# Patient Record
Sex: MALE | Race: WHITE | NOT HISPANIC OR LATINO | Employment: OTHER | URBAN - METROPOLITAN AREA
[De-identification: names, ages, dates, MRNs, and addresses within clinical notes are randomized per-mention and may not be internally consistent; named-entity substitution may affect disease eponyms.]

---

## 2017-07-31 ENCOUNTER — HOSPITAL ENCOUNTER (OUTPATIENT)
Dept: GENERAL RADIOLOGY | Facility: HOSPITAL | Age: 72
Discharge: HOME | End: 2017-07-31
Admitting: ORTHOPAEDIC SURGERY

## 2017-07-31 ENCOUNTER — HOSPITAL ENCOUNTER (OUTPATIENT)
Dept: GENERAL RADIOLOGY | Facility: HOSPITAL | Age: 72
Discharge: HOME | End: 2017-07-31

## 2017-07-31 ENCOUNTER — APPOINTMENT (OUTPATIENT)
Dept: PREADMISSION TESTING | Facility: HOSPITAL | Age: 72
End: 2017-07-31

## 2017-07-31 VITALS
HEIGHT: 73 IN | BODY MASS INDEX: 35.12 KG/M2 | TEMPERATURE: 98.7 F | WEIGHT: 265 LBS | HEART RATE: 66 BPM | RESPIRATION RATE: 20 BRPM | DIASTOLIC BLOOD PRESSURE: 74 MMHG | SYSTOLIC BLOOD PRESSURE: 158 MMHG | OXYGEN SATURATION: 95 %

## 2017-07-31 LAB
ALBUMIN SERPL-MCNC: 4.5 G/DL (ref 3.5–5.2)
ALBUMIN/GLOB SERPL: 1.5 G/DL
ALP SERPL-CCNC: 49 U/L (ref 39–117)
ALT SERPL W P-5'-P-CCNC: 22 U/L (ref 1–41)
ANION GAP SERPL CALCULATED.3IONS-SCNC: 14.5 MMOL/L
APTT PPP: 28.5 SECONDS (ref 22.7–35.4)
AST SERPL-CCNC: 21 U/L (ref 1–40)
BASOPHILS # BLD AUTO: 0.03 10*3/MM3 (ref 0–0.2)
BASOPHILS NFR BLD AUTO: 0.4 % (ref 0–1.5)
BILIRUB SERPL-MCNC: 0.5 MG/DL (ref 0.1–1.2)
BILIRUB UR QL STRIP: NEGATIVE
BUN BLD-MCNC: 19 MG/DL (ref 8–23)
BUN/CREAT SERPL: 20 (ref 7–25)
CALCIUM SPEC-SCNC: 9.7 MG/DL (ref 8.6–10.5)
CHLORIDE SERPL-SCNC: 98 MMOL/L (ref 98–107)
CLARITY UR: CLEAR
CO2 SERPL-SCNC: 28.5 MMOL/L (ref 22–29)
COLOR UR: YELLOW
CREAT BLD-MCNC: 0.95 MG/DL (ref 0.76–1.27)
DEPRECATED RDW RBC AUTO: 49.6 FL (ref 37–54)
EOSINOPHIL # BLD AUTO: 0.28 10*3/MM3 (ref 0–0.7)
EOSINOPHIL NFR BLD AUTO: 3.7 % (ref 0.3–6.2)
ERYTHROCYTE [DISTWIDTH] IN BLOOD BY AUTOMATED COUNT: 14.7 % (ref 11.5–14.5)
GFR SERPL CREATININE-BSD FRML MDRD: 78 ML/MIN/1.73
GLOBULIN UR ELPH-MCNC: 3 GM/DL
GLUCOSE BLD-MCNC: 102 MG/DL (ref 65–99)
GLUCOSE UR STRIP-MCNC: NEGATIVE MG/DL
HCT VFR BLD AUTO: 40.1 % (ref 40.4–52.2)
HGB BLD-MCNC: 12.9 G/DL (ref 13.7–17.6)
HGB UR QL STRIP.AUTO: NEGATIVE
IMM GRANULOCYTES # BLD: 0 10*3/MM3 (ref 0–0.03)
IMM GRANULOCYTES NFR BLD: 0 % (ref 0–0.5)
INR PPP: 1.05 (ref 0.9–1.1)
KETONES UR QL STRIP: NEGATIVE
LEUKOCYTE ESTERASE UR QL STRIP.AUTO: NEGATIVE
LYMPHOCYTES # BLD AUTO: 2.02 10*3/MM3 (ref 0.9–4.8)
LYMPHOCYTES NFR BLD AUTO: 26.4 % (ref 19.6–45.3)
MCH RBC QN AUTO: 30 PG (ref 27–32.7)
MCHC RBC AUTO-ENTMCNC: 32.2 G/DL (ref 32.6–36.4)
MCV RBC AUTO: 93.3 FL (ref 79.8–96.2)
MONOCYTES # BLD AUTO: 0.67 10*3/MM3 (ref 0.2–1.2)
MONOCYTES NFR BLD AUTO: 8.8 % (ref 5–12)
NEUTROPHILS # BLD AUTO: 4.64 10*3/MM3 (ref 1.9–8.1)
NEUTROPHILS NFR BLD AUTO: 60.7 % (ref 42.7–76)
NITRITE UR QL STRIP: NEGATIVE
PH UR STRIP.AUTO: 5.5 [PH] (ref 5–8)
PLATELET # BLD AUTO: 190 10*3/MM3 (ref 140–500)
PMV BLD AUTO: 11 FL (ref 6–12)
POTASSIUM BLD-SCNC: 4.6 MMOL/L (ref 3.5–5.2)
PROT SERPL-MCNC: 7.5 G/DL (ref 6–8.5)
PROT UR QL STRIP: NEGATIVE
PROTHROMBIN TIME: 13.3 SECONDS (ref 11.7–14.2)
RBC # BLD AUTO: 4.3 10*6/MM3 (ref 4.6–6)
SODIUM BLD-SCNC: 141 MMOL/L (ref 136–145)
SP GR UR STRIP: 1.01 (ref 1–1.03)
UROBILINOGEN UR QL STRIP: NORMAL
WBC NRBC COR # BLD: 7.64 10*3/MM3 (ref 4.5–10.7)

## 2017-07-31 PROCEDURE — 85610 PROTHROMBIN TIME: CPT | Performed by: ORTHOPAEDIC SURGERY

## 2017-07-31 PROCEDURE — 36415 COLL VENOUS BLD VENIPUNCTURE: CPT

## 2017-07-31 PROCEDURE — 85730 THROMBOPLASTIN TIME PARTIAL: CPT | Performed by: ORTHOPAEDIC SURGERY

## 2017-07-31 PROCEDURE — 81003 URINALYSIS AUTO W/O SCOPE: CPT | Performed by: ORTHOPAEDIC SURGERY

## 2017-07-31 PROCEDURE — 71020 HC CHEST PA AND LATERAL: CPT

## 2017-07-31 PROCEDURE — 85025 COMPLETE CBC W/AUTO DIFF WBC: CPT | Performed by: ORTHOPAEDIC SURGERY

## 2017-07-31 PROCEDURE — 73560 X-RAY EXAM OF KNEE 1 OR 2: CPT

## 2017-07-31 PROCEDURE — 80053 COMPREHEN METABOLIC PANEL: CPT | Performed by: ORTHOPAEDIC SURGERY

## 2017-07-31 RX ORDER — CHOLECALCIFEROL (VITAMIN D3) 125 MCG
500 CAPSULE ORAL EVERY MORNING
COMMUNITY

## 2017-07-31 RX ORDER — ATORVASTATIN CALCIUM 80 MG/1
80 TABLET, FILM COATED ORAL EVERY MORNING
COMMUNITY

## 2017-07-31 RX ORDER — METOPROLOL TARTRATE 50 MG/1
50 TABLET, FILM COATED ORAL 2 TIMES DAILY
COMMUNITY

## 2017-07-31 RX ORDER — AMITRIPTYLINE HYDROCHLORIDE 25 MG/1
25 TABLET, FILM COATED ORAL NIGHTLY
Status: ON HOLD | COMMUNITY
End: 2020-05-29

## 2017-07-31 RX ORDER — HYDROCHLOROTHIAZIDE 25 MG/1
25 TABLET ORAL EVERY MORNING
COMMUNITY

## 2017-07-31 RX ORDER — TERAZOSIN 5 MG/1
5 CAPSULE ORAL NIGHTLY
COMMUNITY

## 2017-07-31 RX ORDER — ASPIRIN 81 MG/1
81 TABLET ORAL EVERY MORNING
COMMUNITY
End: 2017-08-17 | Stop reason: HOSPADM

## 2017-07-31 RX ORDER — TRAMADOL HYDROCHLORIDE 50 MG/1
50 TABLET ORAL EVERY 6 HOURS PRN
COMMUNITY

## 2017-07-31 RX ORDER — LISINOPRIL 40 MG/1
40 TABLET ORAL EVERY MORNING
COMMUNITY

## 2017-07-31 RX ORDER — GLIPIZIDE 5 MG/1
10 TABLET ORAL
COMMUNITY

## 2017-08-14 ENCOUNTER — ANESTHESIA (OUTPATIENT)
Dept: PERIOP | Facility: HOSPITAL | Age: 72
End: 2017-08-14

## 2017-08-14 ENCOUNTER — APPOINTMENT (OUTPATIENT)
Dept: GENERAL RADIOLOGY | Facility: HOSPITAL | Age: 72
End: 2017-08-14

## 2017-08-14 ENCOUNTER — HOSPITAL ENCOUNTER (INPATIENT)
Facility: HOSPITAL | Age: 72
LOS: 3 days | Discharge: HOME HEALTH | End: 2017-08-17
Attending: ORTHOPAEDIC SURGERY | Admitting: ORTHOPAEDIC SURGERY

## 2017-08-14 ENCOUNTER — ANESTHESIA EVENT (OUTPATIENT)
Dept: PERIOP | Facility: HOSPITAL | Age: 72
End: 2017-08-14

## 2017-08-14 DIAGNOSIS — T84.093A FAILED TOTAL KNEE, LEFT (HCC): ICD-10-CM

## 2017-08-14 PROBLEM — M17.9 OSTEOARTHRITIS, KNEE: Status: ACTIVE | Noted: 2017-08-14

## 2017-08-14 LAB
GLUCOSE BLDC GLUCOMTR-MCNC: 106 MG/DL (ref 70–130)
GLUCOSE BLDC GLUCOMTR-MCNC: 193 MG/DL (ref 70–130)

## 2017-08-14 PROCEDURE — C1776 JOINT DEVICE (IMPLANTABLE): HCPCS | Performed by: ORTHOPAEDIC SURGERY

## 2017-08-14 PROCEDURE — 25010000002 FENTANYL CITRATE (PF) 100 MCG/2ML SOLUTION: Performed by: ANESTHESIOLOGY

## 2017-08-14 PROCEDURE — 25010000002 ONDANSETRON PER 1 MG: Performed by: ANESTHESIOLOGY

## 2017-08-14 PROCEDURE — 25010000002 SUCCINYLCHOLINE PER 20 MG: Performed by: NURSE ANESTHETIST, CERTIFIED REGISTERED

## 2017-08-14 PROCEDURE — 0SPD0JZ REMOVAL OF SYNTHETIC SUBSTITUTE FROM LEFT KNEE JOINT, OPEN APPROACH: ICD-10-PCS | Performed by: ORTHOPAEDIC SURGERY

## 2017-08-14 PROCEDURE — 25010000002 KETOROLAC TROMETHAMINE PER 15 MG

## 2017-08-14 PROCEDURE — 25010000003 CEFAZOLIN IN DEXTROSE 2-4 GM/100ML-% SOLUTION: Performed by: ANESTHESIOLOGY

## 2017-08-14 PROCEDURE — 25010000002 HYDROMORPHONE PER 4 MG: Performed by: ANESTHESIOLOGY

## 2017-08-14 PROCEDURE — 25010000002 FENTANYL CITRATE (PF) 100 MCG/2ML SOLUTION: Performed by: NURSE ANESTHETIST, CERTIFIED REGISTERED

## 2017-08-14 PROCEDURE — 73560 X-RAY EXAM OF KNEE 1 OR 2: CPT

## 2017-08-14 PROCEDURE — 25010000002 NEOSTIGMINE PER 0.5 MG: Performed by: ANESTHESIOLOGY

## 2017-08-14 PROCEDURE — 25010000002 MIDAZOLAM PER 1 MG: Performed by: ANESTHESIOLOGY

## 2017-08-14 PROCEDURE — 0SRD0J9 REPLACEMENT OF LEFT KNEE JOINT WITH SYNTHETIC SUBSTITUTE, CEMENTED, OPEN APPROACH: ICD-10-PCS | Performed by: ORTHOPAEDIC SURGERY

## 2017-08-14 PROCEDURE — 87070 CULTURE OTHR SPECIMN AEROBIC: CPT | Performed by: ORTHOPAEDIC SURGERY

## 2017-08-14 PROCEDURE — 25010000003 CEFAZOLIN IN DEXTROSE 2-4 GM/100ML-% SOLUTION: Performed by: ORTHOPAEDIC SURGERY

## 2017-08-14 PROCEDURE — 87205 SMEAR GRAM STAIN: CPT | Performed by: ORTHOPAEDIC SURGERY

## 2017-08-14 PROCEDURE — C1713 ANCHOR/SCREW BN/BN,TIS/BN: HCPCS | Performed by: ORTHOPAEDIC SURGERY

## 2017-08-14 PROCEDURE — 25010000002 VANCOMYCIN: Performed by: ORTHOPAEDIC SURGERY

## 2017-08-14 PROCEDURE — 82962 GLUCOSE BLOOD TEST: CPT

## 2017-08-14 PROCEDURE — 25010000002 PROPOFOL 10 MG/ML EMULSION: Performed by: NURSE ANESTHETIST, CERTIFIED REGISTERED

## 2017-08-14 DEVICE — IMPLANTABLE DEVICE: Type: IMPLANTABLE DEVICE | Site: KNEE | Status: FUNCTIONAL

## 2017-08-14 DEVICE — CMT BONE PALACOS 120001: Type: IMPLANTABLE DEVICE | Site: KNEE | Status: FUNCTIONAL

## 2017-08-14 RX ORDER — ACETAMINOPHEN 325 MG/1
325 TABLET ORAL EVERY 4 HOURS PRN
Status: DISCONTINUED | OUTPATIENT
Start: 2017-08-14 | End: 2017-08-17 | Stop reason: HOSPADM

## 2017-08-14 RX ORDER — KETOROLAC TROMETHAMINE 30 MG/ML
INJECTION, SOLUTION INTRAMUSCULAR; INTRAVENOUS
Status: COMPLETED
Start: 2017-08-14 | End: 2017-08-14

## 2017-08-14 RX ORDER — PROMETHAZINE HYDROCHLORIDE 25 MG/1
12.5 TABLET ORAL ONCE AS NEEDED
Status: DISCONTINUED | OUTPATIENT
Start: 2017-08-14 | End: 2017-08-14 | Stop reason: HOSPADM

## 2017-08-14 RX ORDER — CEFAZOLIN SODIUM 2 G/100ML
2 INJECTION, SOLUTION INTRAVENOUS EVERY 8 HOURS
Status: COMPLETED | OUTPATIENT
Start: 2017-08-14 | End: 2017-08-15

## 2017-08-14 RX ORDER — ONDANSETRON 4 MG/1
4 TABLET, ORALLY DISINTEGRATING ORAL EVERY 6 HOURS PRN
Status: DISCONTINUED | OUTPATIENT
Start: 2017-08-14 | End: 2017-08-17 | Stop reason: HOSPADM

## 2017-08-14 RX ORDER — GLYCOPYRROLATE 0.2 MG/ML
INJECTION INTRAMUSCULAR; INTRAVENOUS AS NEEDED
Status: DISCONTINUED | OUTPATIENT
Start: 2017-08-14 | End: 2017-08-14 | Stop reason: SURG

## 2017-08-14 RX ORDER — PROMETHAZINE HYDROCHLORIDE 25 MG/1
25 TABLET ORAL ONCE AS NEEDED
Status: DISCONTINUED | OUTPATIENT
Start: 2017-08-14 | End: 2017-08-14 | Stop reason: HOSPADM

## 2017-08-14 RX ORDER — LIDOCAINE HYDROCHLORIDE 10 MG/ML
0.5 INJECTION, SOLUTION EPIDURAL; INFILTRATION; INTRACAUDAL; PERINEURAL ONCE AS NEEDED
Status: DISCONTINUED | OUTPATIENT
Start: 2017-08-14 | End: 2017-08-14 | Stop reason: HOSPADM

## 2017-08-14 RX ORDER — BISACODYL 5 MG/1
10 TABLET, DELAYED RELEASE ORAL DAILY PRN
Status: DISCONTINUED | OUTPATIENT
Start: 2017-08-14 | End: 2017-08-17 | Stop reason: HOSPADM

## 2017-08-14 RX ORDER — MIDAZOLAM HYDROCHLORIDE 1 MG/ML
1 INJECTION INTRAMUSCULAR; INTRAVENOUS
Status: DISCONTINUED | OUTPATIENT
Start: 2017-08-14 | End: 2017-08-14 | Stop reason: HOSPADM

## 2017-08-14 RX ORDER — AMITRIPTYLINE HYDROCHLORIDE 25 MG/1
25 TABLET, FILM COATED ORAL NIGHTLY
Status: DISCONTINUED | OUTPATIENT
Start: 2017-08-14 | End: 2017-08-17 | Stop reason: HOSPADM

## 2017-08-14 RX ORDER — BISACODYL 10 MG
10 SUPPOSITORY, RECTAL RECTAL DAILY PRN
Status: DISCONTINUED | OUTPATIENT
Start: 2017-08-14 | End: 2017-08-17 | Stop reason: HOSPADM

## 2017-08-14 RX ORDER — CEFAZOLIN SODIUM 2 G/100ML
INJECTION, SOLUTION INTRAVENOUS AS NEEDED
Status: DISCONTINUED | OUTPATIENT
Start: 2017-08-14 | End: 2017-08-14 | Stop reason: SURG

## 2017-08-14 RX ORDER — GLIPIZIDE 5 MG/1
5 TABLET ORAL
Status: DISCONTINUED | OUTPATIENT
Start: 2017-08-15 | End: 2017-08-17 | Stop reason: HOSPADM

## 2017-08-14 RX ORDER — LISINOPRIL 40 MG/1
40 TABLET ORAL EVERY MORNING
Status: DISCONTINUED | OUTPATIENT
Start: 2017-08-15 | End: 2017-08-17 | Stop reason: HOSPADM

## 2017-08-14 RX ORDER — LABETALOL HYDROCHLORIDE 5 MG/ML
5 INJECTION, SOLUTION INTRAVENOUS
Status: DISCONTINUED | OUTPATIENT
Start: 2017-08-14 | End: 2017-08-14 | Stop reason: HOSPADM

## 2017-08-14 RX ORDER — ONDANSETRON 4 MG/1
4 TABLET, FILM COATED ORAL EVERY 6 HOURS PRN
Status: DISCONTINUED | OUTPATIENT
Start: 2017-08-14 | End: 2017-08-17 | Stop reason: HOSPADM

## 2017-08-14 RX ORDER — KETOROLAC TROMETHAMINE 30 MG/ML
30 INJECTION, SOLUTION INTRAMUSCULAR; INTRAVENOUS EVERY 6 HOURS
Status: COMPLETED | OUTPATIENT
Start: 2017-08-14 | End: 2017-08-15

## 2017-08-14 RX ORDER — ONDANSETRON 2 MG/ML
4 INJECTION INTRAMUSCULAR; INTRAVENOUS EVERY 6 HOURS PRN
Status: DISCONTINUED | OUTPATIENT
Start: 2017-08-14 | End: 2017-08-17 | Stop reason: HOSPADM

## 2017-08-14 RX ORDER — EPHEDRINE SULFATE 50 MG/ML
5 INJECTION, SOLUTION INTRAVENOUS ONCE AS NEEDED
Status: DISCONTINUED | OUTPATIENT
Start: 2017-08-14 | End: 2017-08-14 | Stop reason: HOSPADM

## 2017-08-14 RX ORDER — HYDROCHLOROTHIAZIDE 25 MG/1
25 TABLET ORAL EVERY MORNING
Status: DISCONTINUED | OUTPATIENT
Start: 2017-08-15 | End: 2017-08-17 | Stop reason: HOSPADM

## 2017-08-14 RX ORDER — ONDANSETRON 2 MG/ML
4 INJECTION INTRAMUSCULAR; INTRAVENOUS ONCE AS NEEDED
Status: DISCONTINUED | OUTPATIENT
Start: 2017-08-14 | End: 2017-08-14 | Stop reason: HOSPADM

## 2017-08-14 RX ORDER — ROCURONIUM BROMIDE 10 MG/ML
INJECTION, SOLUTION INTRAVENOUS AS NEEDED
Status: DISCONTINUED | OUTPATIENT
Start: 2017-08-14 | End: 2017-08-14 | Stop reason: SURG

## 2017-08-14 RX ORDER — SENNA AND DOCUSATE SODIUM 50; 8.6 MG/1; MG/1
2 TABLET, FILM COATED ORAL 2 TIMES DAILY
Status: DISCONTINUED | OUTPATIENT
Start: 2017-08-14 | End: 2017-08-17 | Stop reason: HOSPADM

## 2017-08-14 RX ORDER — PROPOFOL 10 MG/ML
VIAL (ML) INTRAVENOUS AS NEEDED
Status: DISCONTINUED | OUTPATIENT
Start: 2017-08-14 | End: 2017-08-14 | Stop reason: SURG

## 2017-08-14 RX ORDER — NALOXONE HCL 0.4 MG/ML
0.2 VIAL (ML) INJECTION AS NEEDED
Status: DISCONTINUED | OUTPATIENT
Start: 2017-08-14 | End: 2017-08-14 | Stop reason: HOSPADM

## 2017-08-14 RX ORDER — OXYCODONE AND ACETAMINOPHEN 7.5; 325 MG/1; MG/1
1 TABLET ORAL ONCE AS NEEDED
Status: DISCONTINUED | OUTPATIENT
Start: 2017-08-14 | End: 2017-08-14 | Stop reason: HOSPADM

## 2017-08-14 RX ORDER — FENTANYL CITRATE 50 UG/ML
100 INJECTION, SOLUTION INTRAMUSCULAR; INTRAVENOUS
Status: DISCONTINUED | OUTPATIENT
Start: 2017-08-14 | End: 2017-08-14 | Stop reason: HOSPADM

## 2017-08-14 RX ORDER — PROMETHAZINE HYDROCHLORIDE 25 MG/ML
12.5 INJECTION, SOLUTION INTRAMUSCULAR; INTRAVENOUS ONCE AS NEEDED
Status: DISCONTINUED | OUTPATIENT
Start: 2017-08-14 | End: 2017-08-14 | Stop reason: HOSPADM

## 2017-08-14 RX ORDER — SODIUM CHLORIDE, SODIUM LACTATE, POTASSIUM CHLORIDE, CALCIUM CHLORIDE 600; 310; 30; 20 MG/100ML; MG/100ML; MG/100ML; MG/100ML
9 INJECTION, SOLUTION INTRAVENOUS CONTINUOUS
Status: DISCONTINUED | OUTPATIENT
Start: 2017-08-14 | End: 2017-08-17 | Stop reason: HOSPADM

## 2017-08-14 RX ORDER — FENTANYL CITRATE 50 UG/ML
INJECTION, SOLUTION INTRAMUSCULAR; INTRAVENOUS AS NEEDED
Status: DISCONTINUED | OUTPATIENT
Start: 2017-08-14 | End: 2017-08-14 | Stop reason: SURG

## 2017-08-14 RX ORDER — HYDROMORPHONE HYDROCHLORIDE 1 MG/ML
0.5 INJECTION, SOLUTION INTRAMUSCULAR; INTRAVENOUS; SUBCUTANEOUS
Status: DISCONTINUED | OUTPATIENT
Start: 2017-08-14 | End: 2017-08-14 | Stop reason: HOSPADM

## 2017-08-14 RX ORDER — MIDAZOLAM HYDROCHLORIDE 1 MG/ML
2 INJECTION INTRAMUSCULAR; INTRAVENOUS
Status: DISCONTINUED | OUTPATIENT
Start: 2017-08-14 | End: 2017-08-14 | Stop reason: HOSPADM

## 2017-08-14 RX ORDER — OXYCODONE HYDROCHLORIDE AND ACETAMINOPHEN 5; 325 MG/1; MG/1
2 TABLET ORAL EVERY 4 HOURS PRN
Status: DISCONTINUED | OUTPATIENT
Start: 2017-08-14 | End: 2017-08-17 | Stop reason: HOSPADM

## 2017-08-14 RX ORDER — FAMOTIDINE 10 MG/ML
20 INJECTION, SOLUTION INTRAVENOUS ONCE
Status: COMPLETED | OUTPATIENT
Start: 2017-08-14 | End: 2017-08-14

## 2017-08-14 RX ORDER — ACETAMINOPHEN 500 MG
1000 TABLET ORAL ONCE
Status: COMPLETED | OUTPATIENT
Start: 2017-08-14 | End: 2017-08-14

## 2017-08-14 RX ORDER — TRANEXAMIC ACID 100 MG/ML
INJECTION, SOLUTION INTRAVENOUS AS NEEDED
Status: DISCONTINUED | OUTPATIENT
Start: 2017-08-14 | End: 2017-08-14 | Stop reason: SURG

## 2017-08-14 RX ORDER — ACETAMINOPHEN 325 MG/1
650 TABLET ORAL EVERY 4 HOURS PRN
Status: DISCONTINUED | OUTPATIENT
Start: 2017-08-14 | End: 2017-08-17 | Stop reason: HOSPADM

## 2017-08-14 RX ORDER — METOPROLOL TARTRATE 50 MG/1
50 TABLET, FILM COATED ORAL EVERY 12 HOURS SCHEDULED
Status: DISCONTINUED | OUTPATIENT
Start: 2017-08-14 | End: 2017-08-17 | Stop reason: HOSPADM

## 2017-08-14 RX ORDER — NALOXONE HCL 0.4 MG/ML
0.1 VIAL (ML) INJECTION
Status: DISCONTINUED | OUTPATIENT
Start: 2017-08-14 | End: 2017-08-17 | Stop reason: HOSPADM

## 2017-08-14 RX ORDER — PROMETHAZINE HYDROCHLORIDE 25 MG/1
25 SUPPOSITORY RECTAL ONCE AS NEEDED
Status: DISCONTINUED | OUTPATIENT
Start: 2017-08-14 | End: 2017-08-14 | Stop reason: HOSPADM

## 2017-08-14 RX ORDER — SUCCINYLCHOLINE CHLORIDE 20 MG/ML
INJECTION INTRAMUSCULAR; INTRAVENOUS AS NEEDED
Status: DISCONTINUED | OUTPATIENT
Start: 2017-08-14 | End: 2017-08-14 | Stop reason: SURG

## 2017-08-14 RX ORDER — FLUMAZENIL 0.1 MG/ML
0.2 INJECTION INTRAVENOUS AS NEEDED
Status: DISCONTINUED | OUTPATIENT
Start: 2017-08-14 | End: 2017-08-14 | Stop reason: HOSPADM

## 2017-08-14 RX ORDER — HYDROCODONE BITARTRATE AND ACETAMINOPHEN 7.5; 325 MG/1; MG/1
1 TABLET ORAL ONCE AS NEEDED
Status: COMPLETED | OUTPATIENT
Start: 2017-08-14 | End: 2017-08-14

## 2017-08-14 RX ORDER — TERAZOSIN 5 MG/1
5 CAPSULE ORAL NIGHTLY
Status: DISCONTINUED | OUTPATIENT
Start: 2017-08-14 | End: 2017-08-17 | Stop reason: HOSPADM

## 2017-08-14 RX ORDER — SODIUM CHLORIDE 0.9 % (FLUSH) 0.9 %
1-10 SYRINGE (ML) INJECTION AS NEEDED
Status: DISCONTINUED | OUTPATIENT
Start: 2017-08-14 | End: 2017-08-17 | Stop reason: HOSPADM

## 2017-08-14 RX ORDER — DIPHENHYDRAMINE HYDROCHLORIDE 50 MG/ML
12.5 INJECTION INTRAMUSCULAR; INTRAVENOUS
Status: DISCONTINUED | OUTPATIENT
Start: 2017-08-14 | End: 2017-08-14 | Stop reason: HOSPADM

## 2017-08-14 RX ORDER — LIDOCAINE HYDROCHLORIDE 20 MG/ML
INJECTION, SOLUTION INFILTRATION; PERINEURAL AS NEEDED
Status: DISCONTINUED | OUTPATIENT
Start: 2017-08-14 | End: 2017-08-14 | Stop reason: SURG

## 2017-08-14 RX ORDER — HYDROMORPHONE HCL 110MG/55ML
PATIENT CONTROLLED ANALGESIA SYRINGE INTRAVENOUS AS NEEDED
Status: DISCONTINUED | OUTPATIENT
Start: 2017-08-14 | End: 2017-08-14 | Stop reason: SURG

## 2017-08-14 RX ORDER — FENTANYL CITRATE 50 UG/ML
50 INJECTION, SOLUTION INTRAMUSCULAR; INTRAVENOUS
Status: DISCONTINUED | OUTPATIENT
Start: 2017-08-14 | End: 2017-08-14 | Stop reason: HOSPADM

## 2017-08-14 RX ORDER — SODIUM CHLORIDE, SODIUM LACTATE, POTASSIUM CHLORIDE, CALCIUM CHLORIDE 600; 310; 30; 20 MG/100ML; MG/100ML; MG/100ML; MG/100ML
100 INJECTION, SOLUTION INTRAVENOUS CONTINUOUS
Status: DISCONTINUED | OUTPATIENT
Start: 2017-08-14 | End: 2017-08-17 | Stop reason: HOSPADM

## 2017-08-14 RX ORDER — OXYCODONE HYDROCHLORIDE AND ACETAMINOPHEN 5; 325 MG/1; MG/1
1 TABLET ORAL EVERY 4 HOURS PRN
Status: DISCONTINUED | OUTPATIENT
Start: 2017-08-14 | End: 2017-08-17 | Stop reason: HOSPADM

## 2017-08-14 RX ORDER — FERROUS SULFATE 325(65) MG
325 TABLET ORAL
Status: DISCONTINUED | OUTPATIENT
Start: 2017-08-15 | End: 2017-08-17 | Stop reason: HOSPADM

## 2017-08-14 RX ORDER — HYDRALAZINE HYDROCHLORIDE 20 MG/ML
5 INJECTION INTRAMUSCULAR; INTRAVENOUS
Status: DISCONTINUED | OUTPATIENT
Start: 2017-08-14 | End: 2017-08-14 | Stop reason: HOSPADM

## 2017-08-14 RX ORDER — ASPIRIN 325 MG
325 TABLET, DELAYED RELEASE (ENTERIC COATED) ORAL DAILY
Status: DISCONTINUED | OUTPATIENT
Start: 2017-08-15 | End: 2017-08-17 | Stop reason: HOSPADM

## 2017-08-14 RX ORDER — ATORVASTATIN CALCIUM 80 MG/1
80 TABLET, FILM COATED ORAL EVERY MORNING
Status: DISCONTINUED | OUTPATIENT
Start: 2017-08-15 | End: 2017-08-17 | Stop reason: HOSPADM

## 2017-08-14 RX ORDER — SODIUM CHLORIDE 0.9 % (FLUSH) 0.9 %
1-10 SYRINGE (ML) INJECTION AS NEEDED
Status: DISCONTINUED | OUTPATIENT
Start: 2017-08-14 | End: 2017-08-14 | Stop reason: HOSPADM

## 2017-08-14 RX ORDER — DIPHENHYDRAMINE HCL 25 MG
25 CAPSULE ORAL EVERY 6 HOURS PRN
Status: DISCONTINUED | OUTPATIENT
Start: 2017-08-14 | End: 2017-08-17 | Stop reason: HOSPADM

## 2017-08-14 RX ORDER — ONDANSETRON 2 MG/ML
INJECTION INTRAMUSCULAR; INTRAVENOUS AS NEEDED
Status: DISCONTINUED | OUTPATIENT
Start: 2017-08-14 | End: 2017-08-14 | Stop reason: SURG

## 2017-08-14 RX ADMIN — NEOSTIGMINE METHYLSULFATE 3 MG: 1 INJECTION INTRAMUSCULAR; INTRAVENOUS; SUBCUTANEOUS at 17:50

## 2017-08-14 RX ADMIN — KETOROLAC TROMETHAMINE 30 MG: 30 INJECTION, SOLUTION INTRAMUSCULAR at 20:08

## 2017-08-14 RX ADMIN — CEFAZOLIN SODIUM 2 G: 2 INJECTION, SOLUTION INTRAVENOUS at 15:38

## 2017-08-14 RX ADMIN — AMITRIPTYLINE HYDROCHLORIDE 25 MG: 25 TABLET, FILM COATED ORAL at 22:33

## 2017-08-14 RX ADMIN — HYDROMORPHONE HYDROCHLORIDE 0.5 MG: 1 INJECTION, SOLUTION INTRAMUSCULAR; INTRAVENOUS; SUBCUTANEOUS at 18:40

## 2017-08-14 RX ADMIN — FENTANYL CITRATE 50 MCG: 50 INJECTION INTRAMUSCULAR; INTRAVENOUS at 18:40

## 2017-08-14 RX ADMIN — CEFAZOLIN SODIUM 2 G: 2 INJECTION, SOLUTION INTRAVENOUS at 23:48

## 2017-08-14 RX ADMIN — VANCOMYCIN HYDROCHLORIDE 2000 MG: 1 INJECTION, POWDER, LYOPHILIZED, FOR SOLUTION INTRAVENOUS at 13:04

## 2017-08-14 RX ADMIN — FENTANYL CITRATE 50 MCG: 50 INJECTION, SOLUTION INTRAMUSCULAR; INTRAVENOUS at 16:15

## 2017-08-14 RX ADMIN — FENTANYL CITRATE 50 MCG: 50 INJECTION INTRAMUSCULAR; INTRAVENOUS at 19:00

## 2017-08-14 RX ADMIN — FENTANYL CITRATE 50 MCG: 50 INJECTION, SOLUTION INTRAMUSCULAR; INTRAVENOUS at 15:38

## 2017-08-14 RX ADMIN — GLYCOPYRROLATE 0.6 MG: 0.2 INJECTION INTRAMUSCULAR; INTRAVENOUS at 17:50

## 2017-08-14 RX ADMIN — PROPOFOL 200 MG: 10 INJECTION, EMULSION INTRAVENOUS at 15:09

## 2017-08-14 RX ADMIN — HYDROMORPHONE HYDROCHLORIDE 0.5 MG: 2 INJECTION, SOLUTION INTRAMUSCULAR; INTRAVENOUS; SUBCUTANEOUS at 18:10

## 2017-08-14 RX ADMIN — METFORMIN HYDROCHLORIDE 1000 MG: 1000 TABLET ORAL at 22:34

## 2017-08-14 RX ADMIN — ROCURONIUM BROMIDE 10 MG: 10 INJECTION INTRAVENOUS at 15:37

## 2017-08-14 RX ADMIN — ROCURONIUM BROMIDE 5 MG: 10 INJECTION INTRAVENOUS at 15:09

## 2017-08-14 RX ADMIN — LIDOCAINE HYDROCHLORIDE 60 MG: 20 INJECTION, SOLUTION INFILTRATION; PERINEURAL at 15:09

## 2017-08-14 RX ADMIN — FENTANYL CITRATE 50 MCG: 50 INJECTION INTRAMUSCULAR; INTRAVENOUS at 19:53

## 2017-08-14 RX ADMIN — HYDROMORPHONE HYDROCHLORIDE 0.5 MG: 2 INJECTION, SOLUTION INTRAMUSCULAR; INTRAVENOUS; SUBCUTANEOUS at 18:15

## 2017-08-14 RX ADMIN — HYDROMORPHONE HYDROCHLORIDE 0.5 MG: 1 INJECTION, SOLUTION INTRAMUSCULAR; INTRAVENOUS; SUBCUTANEOUS at 19:54

## 2017-08-14 RX ADMIN — SUCCINYLCHOLINE CHLORIDE 200 MG: 20 INJECTION, SOLUTION INTRAMUSCULAR; INTRAVENOUS; PARENTERAL at 15:09

## 2017-08-14 RX ADMIN — ACETAMINOPHEN 1000 MG: 500 TABLET ORAL at 13:05

## 2017-08-14 RX ADMIN — SODIUM CHLORIDE, POTASSIUM CHLORIDE, SODIUM LACTATE AND CALCIUM CHLORIDE 100 ML/HR: 600; 310; 30; 20 INJECTION, SOLUTION INTRAVENOUS at 22:14

## 2017-08-14 RX ADMIN — FENTANYL CITRATE 50 MCG: 50 INJECTION, SOLUTION INTRAMUSCULAR; INTRAVENOUS at 17:00

## 2017-08-14 RX ADMIN — DOCUSATE SODIUM -SENNOSIDES 2 TABLET: 50; 8.6 TABLET, COATED ORAL at 22:34

## 2017-08-14 RX ADMIN — HYDROCODONE BITARTRATE AND ACETAMINOPHEN 1 TABLET: 7.5; 325 TABLET ORAL at 20:21

## 2017-08-14 RX ADMIN — MIDAZOLAM 2 MG: 1 INJECTION INTRAMUSCULAR; INTRAVENOUS at 13:53

## 2017-08-14 RX ADMIN — TRANEXAMIC ACID 1000 MG: 100 INJECTION, SOLUTION INTRAVENOUS at 17:40

## 2017-08-14 RX ADMIN — SODIUM CHLORIDE, POTASSIUM CHLORIDE, SODIUM LACTATE AND CALCIUM CHLORIDE 9 ML/HR: 600; 310; 30; 20 INJECTION, SOLUTION INTRAVENOUS at 13:53

## 2017-08-14 RX ADMIN — ONDANSETRON 4 MG: 2 INJECTION INTRAMUSCULAR; INTRAVENOUS at 17:45

## 2017-08-14 RX ADMIN — FENTANYL CITRATE 100 MCG: 50 INJECTION, SOLUTION INTRAMUSCULAR; INTRAVENOUS at 15:09

## 2017-08-14 RX ADMIN — OXYCODONE HYDROCHLORIDE AND ACETAMINOPHEN 2 TABLET: 5; 325 TABLET ORAL at 23:46

## 2017-08-14 RX ADMIN — FAMOTIDINE 20 MG: 10 INJECTION, SOLUTION INTRAVENOUS at 13:53

## 2017-08-14 RX ADMIN — SODIUM CHLORIDE, POTASSIUM CHLORIDE, SODIUM LACTATE AND CALCIUM CHLORIDE: 600; 310; 30; 20 INJECTION, SOLUTION INTRAVENOUS at 15:45

## 2017-08-14 NOTE — ANESTHESIA PROCEDURE NOTES
Airway  Urgency: elective    Airway not difficult    General Information and Staff    Patient location during procedure: OR  Anesthesiologist: ADELIA FERNANDEZ  CRNA: ALICIA ASHLEY    Indications and Patient Condition  Indications for airway management: airway protection    Preoxygenated: yes  MILS not maintained throughout  Mask difficulty assessment: 1 - vent by mask    Final Airway Details  Final airway type: endotracheal airway      Successful airway: ETT  Cuffed: yes   Successful intubation technique: direct laryngoscopy  Facilitating devices/methods: intubating stylet  Endotracheal tube insertion site: oral  Blade: Yinka  Blade size: #3  ETT size: 8.0 mm  Cormack-Lehane Classification: grade I - full view of glottis  Placement verified by: chest auscultation   Cuff volume (mL): 9  Measured from: lips  ETT to lips (cm): 23  Number of attempts at approach: 1    Additional Comments  PreO2 100% face mask, IV induction, easy mask, DVL x1, cords noted, tube through, cuff up, EBBSH, +etCO2, = chest movement, tube secured in place, atraumatic, teeth and lips intact as preop.

## 2017-08-14 NOTE — PERIOPERATIVE NURSING NOTE
Received from OR.  Pt on stretcher.  During change of care report, it was found pt had urinated a large amount and the left knee drsg was wet.  Dr Murphy notified.  He came to beside and decide to change drsg.  He did the drsg change.  Pt tolerated well.

## 2017-08-14 NOTE — PLAN OF CARE
Problem: Patient Care Overview (Adult)  Goal: Plan of Care Review  Outcome: Ongoing (interventions implemented as appropriate)    08/14/17 1311   Coping/Psychosocial Response Interventions   Plan Of Care Reviewed With patient   Patient Care Overview   Progress no change       Goal: Adult Individualization and Mutuality  Outcome: Ongoing (interventions implemented as appropriate)    08/14/17 1311   Individualization   Patient Specific Preferences Prefers to be called Moses       Goal: Discharge Needs Assessment  Outcome: Ongoing (interventions implemented as appropriate)    08/14/17 1311   Discharge Needs Assessment   Concerns To Be Addressed no discharge needs identified         Problem: Perioperative Period (Adult)  Goal: Signs and Symptoms of Listed Potential Problems Will be Absent or Manageable (Perioperative Period)  Outcome: Ongoing (interventions implemented as appropriate)    08/14/17 1311   Perioperative Period   Problems Assessed (Perioperative Period) pain;infection   Problems Present (Perioperative Period) none

## 2017-08-14 NOTE — ANESTHESIA PREPROCEDURE EVALUATION
Anesthesia Evaluation     Patient summary reviewed and Nursing notes reviewed   NPO Solid Status: > 8 hours       Airway   Mallampati: III  TM distance: >3 FB  Neck ROM: limited  no difficulty expected  Dental - normal exam     Pulmonary - normal exam   (+) sleep apnea,   Cardiovascular - normal exam    (+) hypertension, CAD, CABG > 6 Months,     ROS comment: No cp/soa    Neuro/Psych- negative ROS  GI/Hepatic/Renal/Endo    (+)  diabetes mellitus,     Musculoskeletal (-) negative ROS    Abdominal  - normal exam   Substance History - negative use     OB/GYN negative ob/gyn ROS         Other                                        Anesthesia Plan    ASA 3     general     intravenous induction   Anesthetic plan and risks discussed with patient.    Plan discussed with CRNA.

## 2017-08-15 LAB
ANION GAP SERPL CALCULATED.3IONS-SCNC: 13.7 MMOL/L
BUN BLD-MCNC: 20 MG/DL (ref 8–23)
BUN/CREAT SERPL: 20.2 (ref 7–25)
CALCIUM SPEC-SCNC: 8.2 MG/DL (ref 8.6–10.5)
CHLORIDE SERPL-SCNC: 98 MMOL/L (ref 98–107)
CO2 SERPL-SCNC: 25.3 MMOL/L (ref 22–29)
CREAT BLD-MCNC: 0.99 MG/DL (ref 0.76–1.27)
GFR SERPL CREATININE-BSD FRML MDRD: 74 ML/MIN/1.73
GLUCOSE BLD-MCNC: 219 MG/DL (ref 65–99)
GLUCOSE BLDC GLUCOMTR-MCNC: 122 MG/DL (ref 70–130)
GLUCOSE BLDC GLUCOMTR-MCNC: 129 MG/DL (ref 70–130)
GLUCOSE BLDC GLUCOMTR-MCNC: 176 MG/DL (ref 70–130)
HCT VFR BLD AUTO: 31.6 % (ref 40.4–52.2)
HGB BLD-MCNC: 10.3 G/DL (ref 13.7–17.6)
POTASSIUM BLD-SCNC: 3.7 MMOL/L (ref 3.5–5.2)
SODIUM BLD-SCNC: 137 MMOL/L (ref 136–145)

## 2017-08-15 PROCEDURE — 97110 THERAPEUTIC EXERCISES: CPT

## 2017-08-15 PROCEDURE — 25010000002 KETOROLAC TROMETHAMINE PER 15 MG: Performed by: ORTHOPAEDIC SURGERY

## 2017-08-15 PROCEDURE — 85014 HEMATOCRIT: CPT | Performed by: ORTHOPAEDIC SURGERY

## 2017-08-15 PROCEDURE — 25010000003 CEFAZOLIN IN DEXTROSE 2-4 GM/100ML-% SOLUTION: Performed by: ORTHOPAEDIC SURGERY

## 2017-08-15 PROCEDURE — 80048 BASIC METABOLIC PNL TOTAL CA: CPT | Performed by: ORTHOPAEDIC SURGERY

## 2017-08-15 PROCEDURE — 97161 PT EVAL LOW COMPLEX 20 MIN: CPT

## 2017-08-15 PROCEDURE — 25010000002 FONDAPARINUX PER 0.5 MG: Performed by: ORTHOPAEDIC SURGERY

## 2017-08-15 PROCEDURE — 97150 GROUP THERAPEUTIC PROCEDURES: CPT

## 2017-08-15 PROCEDURE — 82962 GLUCOSE BLOOD TEST: CPT

## 2017-08-15 PROCEDURE — 85018 HEMOGLOBIN: CPT | Performed by: ORTHOPAEDIC SURGERY

## 2017-08-15 RX ORDER — FONDAPARINUX SODIUM 2.5 MG/.5ML
2.5 INJECTION SUBCUTANEOUS ONCE
Status: COMPLETED | OUTPATIENT
Start: 2017-08-15 | End: 2017-08-15

## 2017-08-15 RX ADMIN — OXYCODONE HYDROCHLORIDE AND ACETAMINOPHEN 2 TABLET: 5; 325 TABLET ORAL at 03:50

## 2017-08-15 RX ADMIN — ASPIRIN 325 MG: 325 TABLET, DELAYED RELEASE ORAL at 08:46

## 2017-08-15 RX ADMIN — GLIPIZIDE 5 MG: 5 TABLET ORAL at 16:42

## 2017-08-15 RX ADMIN — METFORMIN HYDROCHLORIDE 1000 MG: 1000 TABLET ORAL at 08:46

## 2017-08-15 RX ADMIN — OXYCODONE HYDROCHLORIDE AND ACETAMINOPHEN 2 TABLET: 5; 325 TABLET ORAL at 18:17

## 2017-08-15 RX ADMIN — DOCUSATE SODIUM -SENNOSIDES 2 TABLET: 50; 8.6 TABLET, COATED ORAL at 18:17

## 2017-08-15 RX ADMIN — AMITRIPTYLINE HYDROCHLORIDE 25 MG: 25 TABLET, FILM COATED ORAL at 20:44

## 2017-08-15 RX ADMIN — DOCUSATE SODIUM -SENNOSIDES 2 TABLET: 50; 8.6 TABLET, COATED ORAL at 08:46

## 2017-08-15 RX ADMIN — OXYCODONE HYDROCHLORIDE AND ACETAMINOPHEN 2 TABLET: 5; 325 TABLET ORAL at 08:46

## 2017-08-15 RX ADMIN — KETOROLAC TROMETHAMINE 30 MG: 30 INJECTION, SOLUTION INTRAMUSCULAR at 16:42

## 2017-08-15 RX ADMIN — FERROUS SULFATE TAB 325 MG (65 MG ELEMENTAL FE) 325 MG: 325 (65 FE) TAB at 08:46

## 2017-08-15 RX ADMIN — CEFAZOLIN SODIUM 2 G: 2 INJECTION, SOLUTION INTRAVENOUS at 06:43

## 2017-08-15 RX ADMIN — FONDAPARINUX SODIUM 2.5 MG: 2.5 INJECTION, SOLUTION SUBCUTANEOUS at 08:47

## 2017-08-15 RX ADMIN — ATORVASTATIN CALCIUM 80 MG: 80 TABLET, FILM COATED ORAL at 06:47

## 2017-08-15 RX ADMIN — METOPROLOL TARTRATE 50 MG: 50 TABLET, FILM COATED ORAL at 08:46

## 2017-08-15 RX ADMIN — KETOROLAC TROMETHAMINE 30 MG: 30 INJECTION, SOLUTION INTRAMUSCULAR at 03:34

## 2017-08-15 RX ADMIN — LISINOPRIL 40 MG: 40 TABLET ORAL at 08:46

## 2017-08-15 RX ADMIN — GLIPIZIDE 5 MG: 5 TABLET ORAL at 06:47

## 2017-08-15 RX ADMIN — OXYCODONE HYDROCHLORIDE AND ACETAMINOPHEN 2 TABLET: 5; 325 TABLET ORAL at 13:54

## 2017-08-15 RX ADMIN — HYDROCHLOROTHIAZIDE 25 MG: 25 TABLET ORAL at 08:46

## 2017-08-15 RX ADMIN — KETOROLAC TROMETHAMINE 30 MG: 30 INJECTION, SOLUTION INTRAMUSCULAR at 08:46

## 2017-08-15 RX ADMIN — METFORMIN HYDROCHLORIDE 1000 MG: 1000 TABLET ORAL at 18:17

## 2017-08-15 RX ADMIN — OXYCODONE HYDROCHLORIDE AND ACETAMINOPHEN 2 TABLET: 5; 325 TABLET ORAL at 22:17

## 2017-08-15 NOTE — PROGRESS NOTES
"BP 99/52 (BP Location: Left arm, Patient Position: Lying)  Pulse 87  Temp 98.5 °F (36.9 °C) (Oral)   Resp 16  Ht 73.5\" (186.7 cm)  Wt 261 lb 12.8 oz (119 kg)  SpO2 95%  BMI 34.07 kg/m2    Lab Results (last 24 hours)     Procedure Component Value Units Date/Time    POC Glucose Fingerstick [[id number]]  (Normal) Collected:  08/14/17 1226    Specimen:  Blood Updated:  08/14/17 1227     Glucose 106 mg/dL     Narrative:       Meter: [id number] : [id number] Vincent HOOPER    POC Glucose Fingerstick [[id number]]  (Abnormal) Collected:  08/14/17 2147    Specimen:  Blood Updated:  08/14/17 2159     Glucose 193 (H) mg/dL     Narrative:       Meter: [id number] : [id number] Isael Jordan CNA    Hemoglobin & Hematocrit, Blood [[id number]]  (Abnormal) Collected:  08/15/17 0520    Specimen:  Blood Updated:  08/15/17 0538     Hemoglobin 10.3 (L) g/dL      Hematocrit 31.6 (L) %     Basic Metabolic Panel [[id number]]  (Abnormal) Collected:  08/15/17 0520    Specimen:  Blood Updated:  08/15/17 0602     Glucose 219 (H) mg/dL      BUN 20 mg/dL      Creatinine 0.99 mg/dL      Sodium 137 mmol/L      Potassium 3.7 mmol/L      Chloride 98 mmol/L      CO2 25.3 mmol/L      Calcium 8.2 (L) mg/dL      eGFR Non African Amer 74 mL/min/1.73      BUN/Creatinine Ratio 20.2     Anion Gap 13.7 mmol/L     Narrative:       The MDRD GFR formula is only valid for adults with stable renal function between ages 18 and 70.    POC Glucose Fingerstick [[id number]]  (Abnormal) Collected:  08/15/17 0607    Specimen:  Blood Updated:  08/15/17 0619     Glucose 176 (H) mg/dL     Narrative:       Meter: [id number] : [id number] Isael Jordan CNA    Wound Culture [[id number]]  (Normal) Collected:  08/14/17 1539    Specimen:  Wound from Knee, Left Updated:  08/15/17 0649     Wound Culture No growth at less than 24 hours     Gram Stain Result Occasional WBCs seen      No organisms seen          Imaging Results (last 24 hours)     Procedure Component " Value Units Date/Time    XR Knee 1 or 2 View Left [[id number]] Collected:  08/14/17 1840     Updated:  08/14/17 1844    Narrative:       2 VIEWS OF THE LEFT KNEE     HISTORY: Postoperative knee pain     FINDINGS:  1. Satisfactory appearance following total knee arthroplasty, no  evidence of a complication.     This report was finalized on 8/14/2017 6:40 PM by Dr. Bartolo Martinez MD.             Patient Care Team:  Elliott Sun MD as PCP - General (Family Medicine)  Dionte Martinez MD as PCP - Claims Attributed  Aliza Heaton MD (Cardiology)    SUBJECTIVE  Comfortable  PHYSICAL EXAM   NV intact  Active Problems:    Osteoarthritis, knee      PLAN / DISPOSITION:  Arixtra one time dose  HH discharge Thursday   Elliott Murphy MD  08/15/17  7:03 AM

## 2017-08-15 NOTE — THERAPY EVALUATION
Acute Care - Physical Therapy Initial Evaluation  [facility]     Patient Name: Raheem Clay  : 1945  MRN: [id number]  Today's Date: 8/15/2017   Onset of Illness/Injury or Date of Surgery Date: 08/15/17  Date of Referral to PT: 17  Referring Physician: Elliott Slaughter      Admit Date: 2017     Visit Dx:    ICD-10-CM ICD-9-CM   1. 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     V43.65     Patient Active Problem List   Diagnosis   • Osteoarthritis, knee     Past Medical History:   Diagnosis Date   • Arthritis    • Back pain    • Coronary artery disease     cabg x 4   • Diabetes mellitus     type 2   • High cholesterol    • Hypertension    • Knee pain, left    • Sleep apnea      Past Surgical History:   Procedure Laterality Date   • APPENDECTOMY     • CARDIAC SURGERY      cabg x 4 2016   • ELBOW ARTHROSCOPY Left     golfers elbow   • FOOT SURGERY Right     trauma   crushed and skin grafts   • JOINT REPLACEMENT      lt knee   • GA REVISE KNEE JOINT REPLACE,1 PART Left 2017    Procedure: LT TOTAL KNEE ARTHROPLASTY REVISION;  Surgeon: Elliott Murphy MD;  Location: [facility] OR;  Service: Orthopedics          PT ASSESSMENT (last 72 hours)      PT Evaluation       08/15/17 0959 08/15/17 0926    Rehab Evaluation    Document Type evaluation  -MS     Subjective Information agree to therapy;complains of;fatigue;pain  -MS     Patient Effort, Rehab Treatment good  -MS     Symptoms Noted Comment Pt. reports mild pain in his Left knee this AM.  -MS     General Information    Onset of Illness/Injury or Date of Surgery Date 08/15/17  -MS     Referring Physician Elliott Slaughter  -MS     Pertinent History Of Current Problem Pt. s/p Left total knee revision  -MS     Precautions/Limitations fall precautions  -MS     Prior Level of Function independent:  -MS     Equipment Currently Used at Home none  -MS none  -[facility]    Plans/Goals Discussed With patient;agreed upon  -MS     Risks Reviewed patient:  -MS      Benefits Reviewed patient:  -MS     Barriers to Rehab none identified  -MS     Living Environment    Lives With  spouse  -[facility]    Living Arrangements  house  -[facility]    Home Accessibility --   4 with 1 HR to enter; Full flight inside home with 1 HR  -MS no concerns  -[facility]    Transportation Available  car;family or friend will provide  -[facility]    Clinical Impression    Date of Referral to PT 08/14/17  -MS     Criteria for Skilled Therapeutic Interventions Met treatment indicated  -MS     Rehab Potential good, to achieve stated therapy goals  -MS     Pain Assessment    Pain Assessment 0-10  -MS     Pain Score 4  -MS     Post Pain Score 4  -MS     Pain Type Acute pain;Surgical pain  -MS     Pain Location Knee  -MS     Pain Orientation Left  -MS     Pain Intervention(s) Medication (See MAR);Cold applied;Repositioned;Elevated;Rest  -MS     Cognitive Assessment/Intervention    Current Cognitive/Communication Assessment functional  -MS     Orientation Status oriented x 4  -MS     Follows Commands/Answers Questions 100% of the time  -MS     Personal Safety WNL/WFL  -MS     Personal Safety Interventions fall prevention program maintained;gait belt;nonskid shoes/slippers when out of bed;supervised activity  -MS     ROM (Range of Motion)    General ROM --   BUE/RLE (WFL's)  -MS     MMT (Manual Muscle Testing)    General MMT Assessment --   BUE/RLE (4-/5)  -MS     Mobility Assessment/Training    Extremity Weight-Bearing Status left lower extremity  -MS     Left Lower Extremity Weight-Bearing weight-bearing as tolerated  -MS     Bed Mobility, Assessment/Treatment    Bed Mobility, Comment Pt. up in chair this AM.  -MS     Transfer Assessment/Treatment    Transfers, Sit-Stand Rhea contact guard assist  -MS     Transfers, Stand-Sit Rhea contact guard assist  -MS     Transfers, Sit-Stand-Sit, Assist Device rolling walker  -MS     Gait Assessment/Treatment    Gait, Rhea Level contact guard assist  -MS     Gait,  Assistive Device rolling walker  -MS     Gait, Distance (Feet) 100  -MS     Gait, Gait Pattern Analysis swing-through gait  -MS     Gait, Gait Deviations left:;antalgic;hi decreased;forward flexed posture  -MS     Gait, Comment Verbal/tactile cues for posture correction.  -MS     Therapy Exercises    Exercise Protocols total knee  -MS     Total Knee Exercises left:;10 reps;completed protocol  -MS     Positioning and Restraints    Pre-Treatment Position sitting in chair/recliner  -MS     Post Treatment Position chair  -MS     In Chair notified nsg;reclined;sitting;call light within reach;encouraged to call for assist   Ice pack to Left knee  -MS       08/14/17 1256       General Information    Equipment Currently Used at Home none  -[facility]     Living Environment    Lives With spouse  -[facility]     Living Arrangements house  -[facility]     Home Accessibility no concerns  -     Stair Railings at Home none  -     Type of Financial/Environmental Concern none  -     Transportation Available car;family or friend will provide  -       User Key  (r) = Recorded By, (t) = Taken By, (c) = Cosigned By    Initials Name Provider Type    [facility] Nina Suarez, RN Case Manager     Annette Vogt RN Registered Nurse    MS Sanjay Duffy, PT Physical Therapist          Physical Therapy Education     Title: PT OT SLP Therapies (Done)     Topic: Physical Therapy (Done)     Point: Mobility training (Done)    Learning Progress Summary    Learner Readiness Method Response Comment Documented by Status   Patient Acceptance E,D NR,  MS 08/15/17 1003 Done               Point: Home exercise program (Done)    Learning Progress Summary    Learner Readiness Method Response Comment Documented by Status   Patient Acceptance E,D NR,  MS 08/15/17 1003 Done               Point: Body mechanics (Done)    Learning Progress Summary    Learner Readiness Method Response Comment Documented by Status   Patient Acceptance E,D NR,  MS 08/15/17 1003 Done                Point: Precautions (Done)    Learning Progress Summary    Learner Readiness Method Response Comment Documented by Status   Patient Acceptance E,D NR,ALEIDA  MS 08/15/17 1003 Done                      User Key     Initials Effective Dates Name Provider Type Discipline    MS 12/01/15 -  Sanjay Duffy, PT Physical Therapist PT                PT Recommendation and Plan  Anticipated Equipment Needs At Discharge:  (Pt. already owns a RWX for home use.)  Anticipated Discharge Disposition: home with assist, home with home health  Planned Therapy Interventions: balance training, bed mobility training, gait training, home exercise program, patient/family education, postural re-education, ROM (Range of Motion), strengthening, transfer training  PT Frequency: 2 times/day  Plan of Care Review  Plan Of Care Reviewed With: patient  Outcome Summary/Follow up Plan: Pt. will benefit from skilled inpt. P.T. to address his functional deficits and to assist pt. in regaining his independence with functional mobility.          IP PT Goals       08/15/17 1004          Bed Mobility PT LTG    Bed Mobility PT LTG, Date Established 08/15/17  -MS      Bed Mobility PT LTG, Time to Achieve 3 days  -MS      Bed Mobility PT LTG, Activity Type all bed mobility  -MS      Bed Mobility PT LTG, Sunray Level independent  -MS      Transfer Training PT LTG    Transfer Training PT LTG, Date Established 08/15/17  -MS      Transfer Training PT LTG, Time to Achieve 3 days  -MS      Transfer Training PT LTG, Activity Type all transfers  -MS      Transfer Training PT LTG, Sunray Level independent  -MS      Transfer Training PT LTG, Assist Device walker, rolling  -MS      Gait Training PT LTG    Gait Training Goal PT LTG, Date Established 08/15/17  -MS      Gait Training Goal PT LTG, Time to Achieve 3 days  -MS      Gait Training Goal PT LTG, Sunray Level independent  -MS      Gait Training Goal PT LTG, Assist Device walker, rolling   -MS      Gait Training Goal PT LTG, Distance to Achieve 150 feet  -MS      Stair Training PT LTG    Stair Training Goal PT LTG, Date Established 08/15/17  -MS      Stair Training Goal PT LTG, Time to Achieve 3 days  -MS      Stair Training Goal PT LTG, Number of Steps 4  -MS      Stair Training Goal PT LTG, [place] Level contact guard assist  -MS      Stair Training Goal PT LTG, Assist Device 1 handrail  -MS      Range of Motion PT LTG    Range of Motion Goal PT LTG, Date Established 08/15/17  -MS      Range of Motion Goal PT LTG, Time to Achieve 3 days  -MS      Range fo Motion Goal PT LTG, Joint L knee  -MS      Range of Motion Goal PT LTG, AROM Measure (5, 85)  -MS        User Key  (r) = Recorded By, (t) = Taken By, (c) = Cosigned By    Initials Name Provider Type    MS Sanjay Duffy, PT Physical Therapist                Outcome Measures       08/15/17 1000          How much help from another person do you currently need...    Turning from your back to your side while in flat bed without using bedrails? 4  -MS      Moving from lying on back to sitting on the side of a flat bed without bedrails? 3  -MS      Moving to and from a bed to a chair (including a wheelchair)? 3  -MS      Standing up from a chair using your arms (e.g., wheelchair, bedside chair)? 3  -MS      Climbing 3-5 steps with a railing? 3  -MS      To walk in hospital room? 3  -MS      AM-PAC 6 Clicks Score 19  -MS      Functional Assessment    Outcome Measure Options AM-PAC 6 Clicks Basic Mobility (PT)  -MS        User Key  (r) = Recorded By, (t) = Taken By, (c) = Cosigned By    Initials Name Provider Type    MS Sanjay Duffy, PT Physical Therapist           Time Calculation:         PT Charges       08/15/17 1006          Time Calculation    Start Time 0948  -MS      Stop Time 1002  -MS      Time Calculation (min) 14 min  -MS      PT Received On 08/15/17  -MS      PT - Next Appointment 08/15/17  -MS      PT Goal Re-Cert Due Date  08/18/17  -MS        User Key  (r) = Recorded By, (t) = Taken By, (c) = Cosigned By    Initials Name Provider Type    MS Sanjay Duffy, PT Physical Therapist          Therapy Charges for Today     Code Description Service Date Service Provider Modifiers Qty    81536098588 HC PT EVAL LOW COMPLEXITY 1 8/15/2017 Sanjay Duffy, PT GP 1    09678091616 HC PT THER PROC EA 15 MIN 8/15/2017 Sanjay Duffy, PT GP 1          PT G-Codes  Outcome Measure Options: AM-PAC 6 Clicks Basic Mobility (PT)      Sanjay Duffy, PT  8/15/2017

## 2017-08-15 NOTE — PLAN OF CARE
Problem: Patient Care Overview (Adult)  Goal: Plan of Care Review    08/15/17 1004   Coping/Psychosocial Response Interventions   Plan Of Care Reviewed With patient   Outcome Evaluation   Outcome Summary/Follow up Plan Pt. will benefit from skilled inpt. P.T. to address his functional deficits and to assist pt. in regaining his independence with functional mobility.         Problem: Inpatient Physical Therapy  Goal: Bed Mobility Goal LTG- PT    08/15/17 1004   Bed Mobility PT LTG   Bed Mobility PT LTG, Date Established 08/15/17   Bed Mobility PT LTG, Time to Achieve 3 days   Bed Mobility PT LTG, Activity Type all bed mobility   Bed Mobility PT LTG, Crawford Level independent       Goal: Transfer Training Goal 1 LTG- PT    08/15/17 1004   Transfer Training PT LTG   Transfer Training PT LTG, Date Established 08/15/17   Transfer Training PT LTG, Time to Achieve 3 days   Transfer Training PT LTG, Activity Type all transfers   Transfer Training PT LTG, [place] Level independent   Transfer Training PT LTG, Assist Device walker, rolling       Goal: Gait Training Goal LTG- PT    08/15/17 1004   Gait Training PT LTG   Gait Training Goal PT LTG, Date Established 08/15/17   Gait Training Goal PT LTG, Time to Achieve 3 days   Gait Training Goal PT LTG, Crawford Level independent   Gait Training Goal PT LTG, Assist Device walker, rolling   Gait Training Goal PT LTG, Distance to Achieve 150 feet       Goal: Stair Training Goal LTG- PT    08/15/17 1004   Stair Training PT LTG   Stair Training Goal PT LTG, Date Established 08/15/17   Stair Training Goal PT LTG, Time to Achieve 3 days   Stair Training Goal PT LTG, Number of Steps 4   Stair Training Goal PT LTG, [place] Level contact guard assist   Stair Training Goal PT LTG, Assist Device 1 handrail       Goal: Range of Motion Goal LTG- PT    08/15/17 1004   Range of Motion PT LTG   Range of Motion Goal PT LTG, Date Established 08/15/17   Range of Motion Goal PT  LTG, Time to Achieve 3 days   Range fo Motion Goal PT LTG, Joint L knee   Range of Motion Goal PT LTG, AROM Measure (5, 85)

## 2017-08-15 NOTE — PLAN OF CARE
Problem: Patient Care Overview (Adult)  Goal: Plan of Care Review  Outcome: Ongoing (interventions implemented as appropriate)    08/15/17 3080   Coping/Psychosocial Response Interventions   Plan Of Care Reviewed With patient   Patient Care Overview   Progress improving   Outcome Evaluation   Outcome Summary/Follow up Plan good pain control, ambulating better, plan to dc home thursday, educated on bp and glucose monitoring and medications       Goal: Adult Individualization and Mutuality  Outcome: Ongoing (interventions implemented as appropriate)    Problem: Perioperative Period (Adult)  Goal: Signs and Symptoms of Listed Potential Problems Will be Absent or Manageable (Perioperative Period)  Outcome: Ongoing (interventions implemented as appropriate)    Problem: Fall Risk (Adult)  Goal: Absence of Falls  Outcome: Ongoing (interventions implemented as appropriate)    Problem: Knee Replacement, Total (Adult)  Goal: Signs and Symptoms of Listed Potential Problems Will be Absent or Manageable (Knee Replacement, Total)  Outcome: Ongoing (interventions implemented as appropriate)

## 2017-08-15 NOTE — PLAN OF CARE
Problem: Patient Care Overview (Adult)  Goal: Plan of Care Review  Outcome: Ongoing (interventions implemented as appropriate)    08/15/17 0421   Coping/Psychosocial Response Interventions   Plan Of Care Reviewed With patient   Patient Care Overview   Progress improving   Outcome Evaluation   Outcome Summary/Follow up Plan Pt has done well postoperatively. Arrived to floor at 2050. Pain well controlled with Percocets given Q4. Voiding per f/c to be removed in a.m. Educated pt on SA management with use of CPAP./       Goal: Adult Individualization and Mutuality  Outcome: Ongoing (interventions implemented as appropriate)  Goal: Discharge Needs Assessment  Outcome: Ongoing (interventions implemented as appropriate)    Problem: Perioperative Period (Adult)  Goal: Signs and Symptoms of Listed Potential Problems Will be Absent or Manageable (Perioperative Period)  Outcome: Ongoing (interventions implemented as appropriate)    08/15/17 0421   Perioperative Period   Problems Assessed (Perioperative Period) all   Problems Present (Perioperative Period) pain         Problem: Fall Risk (Adult)  Goal: Identify Related Risk Factors and Signs and Symptoms  Outcome: Outcome(s) achieved Date Met:  08/15/17    08/15/17 0421   Fall Risk   Fall Risk: Related Risk Factors gait/mobility problems   Fall Risk: Signs and Symptoms presence of risk factors       Goal: Absence of Falls  Outcome: Ongoing (interventions implemented as appropriate)    08/15/17 0421   Fall Risk (Adult)   Absence of Falls achieves outcome         Problem: Knee Replacement, Total (Adult)  Goal: Signs and Symptoms of Listed Potential Problems Will be Absent or Manageable (Knee Replacement, Total)  Outcome: Ongoing (interventions implemented as appropriate)    08/15/17 0421   Knee Replacement, Total   Problems Assessed (Total Knee Replacement) all   Problems Present (Total Knee Replacement) pain;decreased range of motion

## 2017-08-15 NOTE — DISCHARGE PLACEMENT REQUEST
"Mazama, Maria E JEWELL (72 y.o. Male)     Date of Birth Social Security Number Address Home Phone MRN    1945  [address] [place], [place] [address] [phone] [id number]    [employer] Marital Status          Latter-day        Admission Date Admission Type Admitting Provider Attending Provider Department, Room/Bed    8/14/17 Elective Elliott Murphy MD Sweet, Richard A, MD [address], P782/1    Discharge Date Discharge Disposition Discharge Destination                      Attending Provider: Elliott Murphy MD     Allergies:  No Known Allergies    Isolation:  None   Infection:  None   Code Status:  FULL    Ht:  73.5\" (186.7 cm)   Wt:  261 lb 12.8 oz (119 kg)    Admission Cmt:  None   Principal Problem:  None                Active Insurance as of 8/14/2017     Primary Coverage     Payor Plan Insurance Group Employer/Plan Group    MEDICARE MEDICARE A & B      Payor Plan Address Payor Plan Phone Number Effective From Effective To    [address] [phone] 4/1/2009     [place] [address]       Subscriber Name Subscriber Birth Date Member ID       MARIA E RODRIGES 1945 [id number]           Secondary Coverage     Payor Plan Insurance Group Employer/Plan Group    [facility] SU[facility] [id number]     Payor Plan Address Payor Plan Phone Number Effective From Effective To    [address]  12/1/2016     [place] [address]       Subscriber Name Subscriber Birth Date Member ID       MARIA E RODRIGES 1945 [id number]                 Emergency Contacts      (Rel.) Home Phone Work Phone Mobile Phone    Pinky Rodriges [phone] -- [phone]    DannaRaymundo (Son) [phone] -- [phone]              "

## 2017-08-15 NOTE — ANESTHESIA POSTPROCEDURE EVALUATION
Patient: Raheem Clay    Procedure Summary     Date Anesthesia Start Anesthesia Stop Room / Location    08/14/17 1500 1812  [facility] /  [facility]       Procedure Diagnosis Surgeon Provider    LT TOTAL KNEE ARTHROPLASTY REVISION (Left Knee) No diagnosis on file. MD Balbir Malin MD          Anesthesia Type: general  Last vitals  BP   157/63 (08/14/17 2000)    Temp        Pulse   69 (08/14/17 2000)   Resp   16 (08/14/17 2000)    SpO2   97 % (08/14/17 2000)      Post Anesthesia Care and Evaluation    Patient location during evaluation: PACU  Patient participation: complete - patient participated  Level of consciousness: awake  Pain score: 3  Pain management: adequate  Airway patency: patent  Anesthetic complications: No anesthetic complications  PONV Status: none  Cardiovascular status: acceptable  Respiratory status: acceptable  Hydration status: acceptable

## 2017-08-15 NOTE — PROGRESS NOTES
Discharge Planning Assessment  [facility]     Patient Name: Raheem Clay  MRN: [id number]  Today's Date: 8/15/2017    Admit Date: 8/14/2017          Discharge Needs Assessment       08/15/17 0926    Living Environment    Lives With spouse    Living Arrangements house    Home Accessibility no concerns    Transportation Available car;family or friend will provide    Living Environment    Quality Of Family Relationships involved    Able to Return to Prior Living Arrangements yes    Discharge Needs Assessment    Concerns To Be Addressed no discharge needs identified    Equipment Currently Used at Home none            Discharge Plan       08/15/17 1037    Case Management/Social Work Plan    Plan Home w/ family and [facility]     Patient/Family In Agreement With Plan yes    Additional Comments IMM 8/14, S/w pt, verified facesheet and d/c plan. Pt plans to d/c home w/ the help of his spouse and he would like [facility] . [facility] notified and can accept.         Discharge Placement     Facility/Agency Request Status Selected? Address Phone Number Fax Number    [facility] Accepted    Yes [address] [facility], [facility], [address], [facility] [id number] [phone]        Nina Suarez RN 8/15/2017 10:33    [facility] following .8/15/2017                             Demographic Summary     None            Functional Status       08/15/17 0926    Functional Status Current    Ambulation 3-->assistive equipment and person    Transferring 3-->assistive equipment and person    Toileting 2-->assistive person    Bathing 2-->assistive person    Dressing 2-->assistive person    Eating 0-->independent    Communication 0-->understands/communicates without difficulty    Change in Functional Status Since Onset of Current Illness/Injury yes    Functional Status Prior    Ambulation 0-->independent    Transferring 0-->independent    Toileting 0-->independent    Bathing 0-->independent    Dressing 0-->independent     Eating 0-->independent    Communication 0-->understands/communicates without difficulty    Swallowing 0-->swallows foods/liquids without difficulty    IADL    Medications independent    Meal Preparation independent    Housekeeping independent    Laundry independent    Shopping independent    Oral Care independent    Activity Tolerance    Usual Activity Tolerance good    Current Activity Tolerance fair            Psychosocial     None            Abuse/Neglect     None            Legal     None            Substance Abuse     None            Patient Forms       08/15/17 8898    Patient Forms    Provider Choice List Delivered    Delivered to Patient    Method of delivery Telephone          Nina Suarez RN

## 2017-08-15 NOTE — THERAPY TREATMENT NOTE
Acute Care - Physical Therapy Treatment Note  [facility]     Patient Name: Raheem Clay  : 1945  MRN: [id number]  Today's Date: 8/15/2017  Onset of Illness/Injury or Date of Surgery Date: 08/15/17  Date of Referral to PT: 17  Referring Physician: Elliott Slaughter    Admit Date: 2017    Visit Dx:    ICD-10-CM ICD-9-CM   1. 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     V43.65     Patient Active Problem List   Diagnosis   • Osteoarthritis, knee               Adult Rehabilitation Note       08/15/17 1358          Rehab Assessment/Intervention    Discipline physical therapy assistant  -RW      Document Type therapy note (daily note)  -RW      Subjective Information agree to therapy  -RW      Patient Effort, Rehab Treatment good  -RW      Precautions/Limitations fall precautions  -RW      Recorded by [RW] Shelli Rhodes PTA      Pain Assessment    Pain Assessment 0-10  -RW      Pain Score 5  -RW      Pain Type Acute pain;Surgical pain  -RW      Pain Location Knee  -RW      Pain Orientation Left  -RW      Pain Intervention(s) Medication (See MAR);Repositioned;Ambulation/increased activity  -RW      Response to Interventions tolerated  -RW      Recorded by [RW] Shelli Rhodes PTA      Cognitive Assessment/Intervention    Current Cognitive/Communication Assessment functional  -RW      Orientation Status oriented x 4  -RW      Follows Commands/Answers Questions 100% of the time  -RW      Personal Safety WNL/WFL  -RW      Personal Safety Interventions fall prevention program maintained;gait belt;nonskid shoes/slippers when out of bed  -RW      Recorded by [RW] Shelli Rhodes PTA      ROM (Range of Motion)    General ROM Detail L knee   -RW      Recorded by [RW] Shelli Rhodes PTA      Bed Mobility, Assessment/Treatment    Bed Mob, Supine to Sit, [place] not tested  -RW      Bed Mob, Sit to Supine, [place] not tested  -RW      Bed Mobility, Comment Pt up in chair  -RW      Recorded  by [RW] Shelli Rhodes PTA      Transfer Assessment/Treatment    Transfers, Sit-Stand [place] minimum assist (75% patient effort)  -RW      Transfers, Stand-Sit [place] contact guard assist  -RW      Transfers, Sit-Stand-Sit, Assist Device rolling walker  -RW      Transfer, Safety Issues impulsivity  -RW      Transfer, Comment Pt attempted standing prior to RW being in front of pt and pt had a LOB backwards into chair. [place] required to safely sit back in chair  -RW      Recorded by [RW] Shelli Rhodes PTA      Gait Assessment/Treatment    Gait, [place] Level stand by assist  -RW      Gait, Assistive Device rolling walker  -RW      Gait, Distance (Feet) 150  -RW      Gait, Gait Pattern Analysis swing-through gait  -RW      Gait, Gait Deviations forward flexed posture;left:;antalgic;hi decreased  -RW      Recorded by [RW] Shelli Rhodes PTA      Stairs Assessment/Treatment    Number of Stairs 4  -RW      Stairs, Handrail Location both sides  -RW      Stairs, [place] Level contact guard assist;verbal cues required  -RW      Stairs, Technique Used step to step (ascending);step to step (descending)  -RW      Recorded by [RW] Shelli Rhodes PTA      Therapy Exercises    Exercise Protocols total knee  -RW      Total Knee Exercises left:;15 reps;completed protocol  -RW      Recorded by [NORMA] Shelli Rhodes PTA      Positioning and Restraints    Pre-Treatment Position sitting in chair/recliner  -RW      Post Treatment Position chair  -RW      In Chair reclined;call light within reach;encouraged to call for assist  -RW      Recorded by [RW] Shelli Rhodes PTA        User Key  (r) = Recorded By, (t) = Taken By, (c) = Cosigned By    Initials Name Effective Dates    RW Shelli Rhodes PTA 04/06/16 -                 IP PT Goals       08/15/17 1004          Bed Mobility PT LTG    Bed Mobility PT LTG, Date Established 08/15/17  -MS      Bed Mobility PT LTG, Time to Achieve 3 days  -MS      Bed  Mobility PT LTG, Activity Type all bed mobility  -MS      Bed Mobility PT LTG, [place] Level independent  -MS      Transfer Training PT LTG    Transfer Training PT LTG, Date Established 08/15/17  -MS      Transfer Training PT LTG, Time to Achieve 3 days  -MS      Transfer Training PT LTG, Activity Type all transfers  -MS      Transfer Training PT LTG, [place] Level independent  -MS      Transfer Training PT LTG, Assist Device walker, rolling  -MS      Gait Training PT LTG    Gait Training Goal PT LTG, Date Established 08/15/17  -MS      Gait Training Goal PT LTG, Time to Achieve 3 days  -MS      Gait Training Goal PT LTG, [place] Level independent  -MS      Gait Training Goal PT LTG, Assist Device walker, rolling  -MS      Gait Training Goal PT LTG, Distance to Achieve 150 feet  -MS      Stair Training PT LTG    Stair Training Goal PT LTG, Date Established 08/15/17  -MS      Stair Training Goal PT LTG, Time to Achieve 3 days  -MS      Stair Training Goal PT LTG, Number of Steps 4  -MS      Stair Training Goal PT LTG, [place] Level contact guard assist  -MS      Stair Training Goal PT LTG, Assist Device 1 handrail  -MS      Range of Motion PT LTG    Range of Motion Goal PT LTG, Date Established 08/15/17  -MS      Range of Motion Goal PT LTG, Time to Achieve 3 days  -MS      Range fo Motion Goal PT LTG, Joint L knee  -MS      Range of Motion Goal PT LTG, AROM Measure (5, 85)  -MS        User Key  (r) = Recorded By, (t) = Taken By, (c) = Cosigned By    Initials Name Provider Type    MS Sanjay Duffy, PT Physical Therapist          Physical Therapy Education     Title: PT OT SLP Therapies (Done)     Topic: Physical Therapy (Done)     Point: Mobility training (Done)    Learning Progress Summary    Learner Readiness Method Response Comment Documented by Status   Patient Acceptance E,D NR,ALEIDA  MS 08/15/17 1003 Done               Point: Home exercise program (Done)    Learning Progress Summary     Learner Readiness Method Response Comment Documented by Status   Patient Acceptance E,D NR,VU  MS 08/15/17 1003 Done               Point: Body mechanics (Done)    Learning Progress Summary    Learner Readiness Method Response Comment Documented by Status   Patient Acceptance E,D NR,VU  MS 08/15/17 1003 Done               Point: Precautions (Done)    Learning Progress Summary    Learner Readiness Method Response Comment Documented by Status   Patient Acceptance E,D NR,VU  MS 08/15/17 1003 Done                      User Key     Initials Effective Dates Name Provider Type Discipline    MS 12/01/15 -  Sanjay Duffy, PT Physical Therapist PT                    PT Recommendation and Plan  Anticipated Equipment Needs At Discharge:  (Pt. already owns a RWX for home use.)  Anticipated Discharge Disposition: home with assist, home with home health  Planned Therapy Interventions: balance training, bed mobility training, gait training, home exercise program, patient/family education, postural re-education, ROM (Range of Motion), strengthening, transfer training  PT Frequency: 2 times/day             Outcome Measures       08/15/17 1000          How much help from another person do you currently need...    Turning from your back to your side while in flat bed without using bedrails? 4  -MS      Moving from lying on back to sitting on the side of a flat bed without bedrails? 3  -MS      Moving to and from a bed to a chair (including a wheelchair)? 3  -MS      Standing up from a chair using your arms (e.g., wheelchair, bedside chair)? 3  -MS      Climbing 3-5 steps with a railing? 3  -MS      To walk in hospital room? 3  -MS      AM-PAC 6 Clicks Score 19  -MS      Functional Assessment    Outcome Measure Options AM-PAC 6 Clicks Basic Mobility (PT)  -MS        User Key  (r) = Recorded By, (t) = Taken By, (c) = Cosigned By    Initials Name Provider Type    MS Sanjay AYDIN Duffy, PT Physical Therapist           Time Calculation:          PT Charges       08/15/17 1501 08/15/17 1006       Time Calculation    Start Time 1358  -RW 0948  -MS     Stop Time 1430  -RW 1002  -MS     Time Calculation (min) 32 min  -RW 14 min  -MS     PT Received On 08/15/17  -RW 08/15/17  -MS     PT - Next Appointment 08/16/17  - 08/15/17  -MS     PT Goal Re-Cert Due Date  08/18/17  -MS       User Key  (r) = Recorded By, (t) = Taken By, (c) = Cosigned By    Initials Name Provider Type    MS Sanjay PERRIN Clive, PT Physical Therapist    RW Shelli Rhodes PTA Physical Therapy Assistant          Therapy Charges for Today     Code Description Service Date Service Provider Modifiers Qty    83965769225 HC PT THER PROC EA 15 MIN 8/15/2017 Shelli Rhodes PTA GP 1    26243936675 HC PT THER PROC GROUP 8/15/2017 Shelli Rhodes PTA GP 1          PT G-Codes  Outcome Measure Options: AM-PAC 6 Clicks Basic Mobility (PT)    Shelli Rhodes PTA  8/15/2017

## 2017-08-16 LAB
GLUCOSE BLDC GLUCOMTR-MCNC: 104 MG/DL (ref 70–130)
GLUCOSE BLDC GLUCOMTR-MCNC: 126 MG/DL (ref 70–130)
GLUCOSE BLDC GLUCOMTR-MCNC: 140 MG/DL (ref 70–130)

## 2017-08-16 PROCEDURE — 97150 GROUP THERAPEUTIC PROCEDURES: CPT

## 2017-08-16 PROCEDURE — 97110 THERAPEUTIC EXERCISES: CPT

## 2017-08-16 PROCEDURE — 82962 GLUCOSE BLOOD TEST: CPT

## 2017-08-16 RX ADMIN — FERROUS SULFATE TAB 325 MG (65 MG ELEMENTAL FE) 325 MG: 325 (65 FE) TAB at 08:49

## 2017-08-16 RX ADMIN — METFORMIN HYDROCHLORIDE 1000 MG: 1000 TABLET ORAL at 08:49

## 2017-08-16 RX ADMIN — OXYCODONE HYDROCHLORIDE AND ACETAMINOPHEN 2 TABLET: 5; 325 TABLET ORAL at 03:44

## 2017-08-16 RX ADMIN — OXYCODONE HYDROCHLORIDE AND ACETAMINOPHEN 2 TABLET: 5; 325 TABLET ORAL at 21:03

## 2017-08-16 RX ADMIN — OXYCODONE HYDROCHLORIDE AND ACETAMINOPHEN 2 TABLET: 5; 325 TABLET ORAL at 17:00

## 2017-08-16 RX ADMIN — ASPIRIN 325 MG: 325 TABLET, DELAYED RELEASE ORAL at 08:49

## 2017-08-16 RX ADMIN — OXYCODONE HYDROCHLORIDE AND ACETAMINOPHEN 2 TABLET: 5; 325 TABLET ORAL at 12:44

## 2017-08-16 RX ADMIN — LISINOPRIL 40 MG: 40 TABLET ORAL at 08:50

## 2017-08-16 RX ADMIN — OXYCODONE HYDROCHLORIDE AND ACETAMINOPHEN 2 TABLET: 5; 325 TABLET ORAL at 08:49

## 2017-08-16 RX ADMIN — DOCUSATE SODIUM -SENNOSIDES 2 TABLET: 50; 8.6 TABLET, COATED ORAL at 08:49

## 2017-08-16 RX ADMIN — METOPROLOL TARTRATE 50 MG: 50 TABLET, FILM COATED ORAL at 08:50

## 2017-08-16 RX ADMIN — AMITRIPTYLINE HYDROCHLORIDE 25 MG: 25 TABLET, FILM COATED ORAL at 21:03

## 2017-08-16 RX ADMIN — HYDROCHLOROTHIAZIDE 25 MG: 25 TABLET ORAL at 08:49

## 2017-08-16 RX ADMIN — GLIPIZIDE 5 MG: 5 TABLET ORAL at 08:50

## 2017-08-16 RX ADMIN — METFORMIN HYDROCHLORIDE 1000 MG: 1000 TABLET ORAL at 17:01

## 2017-08-16 RX ADMIN — ATORVASTATIN CALCIUM 80 MG: 80 TABLET, FILM COATED ORAL at 08:49

## 2017-08-16 RX ADMIN — GLIPIZIDE 5 MG: 5 TABLET ORAL at 17:02

## 2017-08-16 RX ADMIN — DOCUSATE SODIUM -SENNOSIDES 2 TABLET: 50; 8.6 TABLET, COATED ORAL at 17:01

## 2017-08-16 NOTE — PLAN OF CARE
Problem: Patient Care Overview (Adult)  Goal: Plan of Care Review  Outcome: Ongoing (interventions implemented as appropriate)  Goal: Adult Individualization and Mutuality  Outcome: Ongoing (interventions implemented as appropriate)  Goal: Discharge Needs Assessment  Outcome: Ongoing (interventions implemented as appropriate)    Problem: Fall Risk (Adult)  Goal: Absence of Falls  Outcome: Ongoing (interventions implemented as appropriate)    Problem: Knee Replacement, Total (Adult)  Goal: Signs and Symptoms of Listed Potential Problems Will be Absent or Manageable (Knee Replacement, Total)  Outcome: Ongoing (interventions implemented as appropriate)

## 2017-08-16 NOTE — THERAPY TREATMENT NOTE
Acute Care - Physical Therapy Treatment Note  [facility]     Patient Name: Raheem Clay  : 1945  MRN: [id number]  Today's Date: 2017  Onset of Illness/Injury or Date of Surgery Date: 08/15/17  Date of Referral to PT: 17  Referring Physician: Elliott Slaughter    Admit Date: 2017    Visit Dx:    ICD-10-CM ICD-9-CM   1. 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     V43.65     Patient Active Problem List   Diagnosis   • Osteoarthritis, knee               Adult Rehabilitation Note       17 1400 17 1000 08/15/17 1358    Rehab Assessment/Intervention    Discipline physical therapy assistant  -RW physical therapy assistant  -RW physical therapy assistant  -RW    Document Type therapy note (daily note)  -RW therapy note (daily note)  -RW therapy note (daily note)  -RW    Subjective Information agree to therapy  -RW agree to therapy  -RW agree to therapy  -RW    Patient Effort, Rehab Treatment good  -RW good  -RW good  -RW    Precautions/Limitations   fall precautions  -RW    Recorded by [RW] Shelli Rhodes PTA [RW] Shelli Rhodes, JACK [RW] Shelli Rhodes PTA    Pain Assessment    Pain Assessment 0-10  -RW 0-10  -RW 0-10  -RW    Pain Score 3  -RW 3  -RW 5  -RW    Pain Type Acute pain;Surgical pain  -RW Acute pain;Surgical pain  -RW Acute pain;Surgical pain  -RW    Pain Location Knee  -RW Knee  -RW Knee  -RW    Pain Orientation Left  -RW Left  -RW Left  -RW    Pain Intervention(s)   Medication (See MAR);Repositioned;Ambulation/increased activity  -RW    Response to Interventions   tolerated  -RW    Recorded by [RW] Shelli Rhodes, JACK [RW] Shelli Rhodes, JACK [RW] Shelli Rhodes, PTA    Cognitive Assessment/Intervention    Current Cognitive/Communication Assessment functional  -RW functional  -RW functional  -RW    Orientation Status oriented x 4  -RW oriented x 4  -RW oriented x 4  -RW    Follows Commands/Answers Questions 100% of the time  -% of the time  -% of the  time  -RW    Personal Safety WNL/WFL  -RW WNL/WFL  -RW WNL/WFL  -RW    Personal Safety Interventions fall prevention program maintained;gait belt;nonskid shoes/slippers when out of bed  -RW fall prevention program maintained;gait belt;nonskid shoes/slippers when out of bed  -RW fall prevention program maintained;gait belt;nonskid shoes/slippers when out of bed  -RW    Recorded by [RW] Shelli Rhodes PTA [RW] Shelli Rhodes PTA [RW] Shelli Rhodes PTA    ROM (Range of Motion)    General ROM Detail  L knee 11-90'  -RW L knee 7-86'  -RW    Recorded by  [RW] Shelli Rhodes PTA [RW] Shelli Rhodes PTA    Bed Mobility, Assessment/Treatment    Bed Mob, Supine to Sit, [place] not tested  -RW not tested  -RW not tested  -RW    Bed Mob, Sit to Supine, [place] not tested  -RW not tested  -RW not tested  -RW    Bed Mobility, Comment Pt up in chair  -RW Pt up in chair  -RW Pt up in chair  -RW    Recorded by [RW] Shelli Rhodes, JACK [RW] Shelli Rhodes, PTA [RW] Shelli Rhodes PTA    Transfer Assessment/Treatment    Transfers, Sit-Stand [place] stand by assist  -RW contact guard assist  -RW minimum assist (75% patient effort)  -RW    Transfers, Stand-Sit [place] stand by assist  -RW stand by assist  -RW contact guard assist  -RW    Transfers, Sit-Stand-Sit, Assist Device rolling walker  -RW rolling walker  -RW rolling walker  -RW    Transfer, Safety Issues   impulsivity  -RW    Transfer, Comment   Pt attempted standing prior to RW being in front of pt and pt had a LOB backwards into chair. [place] required to safely sit back in chair  -RW    Recorded by [RW] Shelli Rhodes PTA [RW] Shelli Rhodes, JACK [RW] Shelli Rhodes PTA    Gait Assessment/Treatment    Gait, [place] Level stand by assist  -RW stand by assist  -RW stand by assist  -RW    Gait, Assistive Device rolling walker  -RW rolling walker  -RW rolling walker  -RW    Gait, Distance (Feet) 240  -  -  -RW    Gait, Gait  Pattern Analysis swing-through gait  -RW swing-through gait  -RW swing-through gait  -RW    Gait, Gait Deviations forward flexed posture;left:;antalgic;hi decreased  -RW forward flexed posture;left:;antalgic;hi decreased  -RW forward flexed posture;left:;antalgic;hi decreased  -RW    Recorded by [RW] Shelli Rhodes PTA [RW] Shelli Rhodes, JACK [RW] Shelli Rhodes PTA    Stairs Assessment/Treatment    Number of Stairs   4  -RW    Stairs, Handrail Location   both sides  -RW    Stairs, [place] Level   contact guard assist;verbal cues required  -RW    Stairs, Technique Used   step to step (ascending);step to step (descending)  -RW    Recorded by   [RW] Shelli Rhodes PTA    Therapy Exercises    Exercise Protocols total knee  -RW total knee  -RW total knee  -RW    Total Knee Exercises left:;25 reps;completed protocol  -RW left:;20 reps;completed protocol  -RW left:;15 reps;completed protocol  -RW    Recorded by [RW] Shelli Rhodes PTA [RW] Shelli Rhodes, JACK [RW] Shelli Rhodes PTA    Positioning and Restraints    Pre-Treatment Position sitting in chair/recliner  -RW sitting in chair/recliner  -RW sitting in chair/recliner  -RW    Post Treatment Position chair  -RW chair  -RW chair  -RW    In Chair reclined;call light within reach;encouraged to call for assist   ice applied to L knee  -RW reclined;call light within reach;encouraged to call for assist;with nsg  -RW reclined;call light within reach;encouraged to call for assist  -RW    Recorded by [RW] Shelli Rhodes PTA [RW] Shelli Rhodes PTA [RW] Shelli Rhodes PTA      User Key  (r) = Recorded By, (t) = Taken By, (c) = Cosigned By    Initials Name Effective Dates    NORMA Rhodes PTA 04/06/16 -                 IP PT Goals       08/15/17 1004          Bed Mobility PT LTG    Bed Mobility PT LTG, Date Established 08/15/17  -MS      Bed Mobility PT LTG, Time to Achieve 3 days  -MS      Bed Mobility PT LTG, Activity Type all bed  mobility  -MS      Bed Mobility PT LTG, [place] Level independent  -MS      Transfer Training PT LTG    Transfer Training PT LTG, Date Established 08/15/17  -MS      Transfer Training PT LTG, Time to Achieve 3 days  -MS      Transfer Training PT LTG, Activity Type all transfers  -MS      Transfer Training PT LTG, [place] Level independent  -MS      Transfer Training PT LTG, Assist Device walker, rolling  -MS      Gait Training PT LTG    Gait Training Goal PT LTG, Date Established 08/15/17  -MS      Gait Training Goal PT LTG, Time to Achieve 3 days  -MS      Gait Training Goal PT LTG, [place] Level independent  -MS      Gait Training Goal PT LTG, Assist Device walker, rolling  -MS      Gait Training Goal PT LTG, Distance to Achieve 150 feet  -MS      Stair Training PT LTG    Stair Training Goal PT LTG, Date Established 08/15/17  -MS      Stair Training Goal PT LTG, Time to Achieve 3 days  -MS      Stair Training Goal PT LTG, Number of Steps 4  -MS      Stair Training Goal PT LTG, [place] Level contact guard assist  -MS      Stair Training Goal PT LTG, Assist Device 1 handrail  -MS      Range of Motion PT LTG    Range of Motion Goal PT LTG, Date Established 08/15/17  -MS      Range of Motion Goal PT LTG, Time to Achieve 3 days  -MS      Range fo Motion Goal PT LTG, Joint L knee  -MS      Range of Motion Goal PT LTG, AROM Measure (5, 85)  -MS        User Key  (r) = Recorded By, (t) = Taken By, (c) = Cosigned By    Initials Name Provider Type    MS Sanjay Duffy, PT Physical Therapist          Physical Therapy Education     Title: PT OT SLP Therapies (Done)     Topic: Physical Therapy (Done)     Point: Mobility training (Done)    Learning Progress Summary    Learner Readiness Method Response Comment Documented by Status   Patient Acceptance E,TB,SHELIA CASE  RW 08/16/17 1010 Done    Acceptance E,ALEIDA HERNANDEZ  MS 08/15/17 1003 Done               Point: Home exercise program (Done)    Learning Progress  Summary    Learner Readiness Method Response Comment Documented by Status   Patient Acceptance E,TB,D VU,NR  RW 08/16/17 1010 Done    Acceptance E,D NR,VU  MS 08/15/17 1003 Done               Point: Body mechanics (Done)    Learning Progress Summary    Learner Readiness Method Response Comment Documented by Status   Patient Acceptance E,TB,D VU,NR  RW 08/16/17 1010 Done    Acceptance E,D NR,VU  MS 08/15/17 1003 Done               Point: Precautions (Done)    Learning Progress Summary    Learner Readiness Method Response Comment Documented by Status   Patient Acceptance E,TB,D VU,NR  RW 08/16/17 1010 Done    Acceptance E,D NR,VU  MS 08/15/17 1003 Done                      User Key     Initials Effective Dates Name Provider Type Discipline    MS 12/01/15 -  Sanjay Duffy, PT Physical Therapist PT     04/06/16 -  Shelli Rhodes, PTA Physical Therapy Assistant PT                    PT Recommendation and Plan  Anticipated Equipment Needs At Discharge:  (Pt. already owns a RWX for home use.)  Anticipated Discharge Disposition: home with assist, home with home health  Planned Therapy Interventions: balance training, bed mobility training, gait training, home exercise program, patient/family education, postural re-education, ROM (Range of Motion), strengthening, transfer training  PT Frequency: 2 times/day  Plan of Care Review  Plan Of Care Reviewed With: patient  Outcome Summary/Follow up Plan: Pt increased ambulation distance and exercise tolerance. Requiring less assist for all mobility and progressing well w/ PT.           Outcome Measures       08/16/17 1000 08/15/17 1000       How much help from another person do you currently need...    Turning from your back to your side while in flat bed without using bedrails? 4  -RW 4  -MS     Moving from lying on back to sitting on the side of a flat bed without bedrails? 4  -RW 3  -MS     Moving to and from a bed to a chair (including a wheelchair)? 3  -RW 3  -MS      Standing up from a chair using your arms (e.g., wheelchair, bedside chair)? 3  -RW 3  -MS     Climbing 3-5 steps with a railing? 3  -RW 3  -MS     To walk in hospital room? 3  -RW 3  -MS     AM-PAC 6 Clicks Score 20  -RW 19  -MS     Functional Assessment    Outcome Measure Options AM-PAC 6 Clicks Basic Mobility (PT)  -RW AM-PAC 6 Clicks Basic Mobility (PT)  -MS       User Key  (r) = Recorded By, (t) = Taken By, (c) = Cosigned By    Initials Name Provider Type    MS Sanjay Duffy, PT Physical Therapist    RW Shelli Rhodes PTA Physical Therapy Assistant           Time Calculation:         PT Charges       08/16/17 1445 08/16/17 1008       Time Calculation    Start Time 1358  -RW 0933  -RW     Stop Time 1425  -RW 1000  -RW     Time Calculation (min) 27 min  -RW 27 min  -RW     PT Received On 08/16/17  -RW 08/16/17  -     PT - Next Appointment 08/17/17  - 08/16/17  -       User Key  (r) = Recorded By, (t) = Taken By, (c) = Cosigned By    Initials Name Provider Type    RW Shelli Rhodes PTA Physical Therapy Assistant          Therapy Charges for Today     Code Description Service Date Service Provider Modifiers Qty    34708707633 HC PT THER PROC EA 15 MIN 8/15/2017 Shelli Rhodes PTA GP 1    77480772198 HC PT THER PROC GROUP 8/15/2017 Shelli Rhodes, PTA GP 1    76407661755 HC PT THER PROC EA 15 MIN 8/16/2017 Shelli Rhodes PTA GP 1    69660331305 HC PT THER PROC GROUP 8/16/2017 Shelli Rhodes, PTA GP 1    26024998613 HC PT THER PROC EA 15 MIN 8/16/2017 Shelli Rhodes PTA GP 1    07572470707 HC PT THER PROC GROUP 8/16/2017 Shelli Rhodes, JACK GP 1          PT G-Codes  Outcome Measure Options: AM-PAC 6 Clicks Basic Mobility (PT)    Shelli Rhodes PTA  8/16/2017

## 2017-08-16 NOTE — PROGRESS NOTES
"/68 (BP Location: Left arm, Patient Position: Sitting)  Pulse 80  Temp 99.1 °F (37.3 °C) (Oral)   Resp 18  Ht 73.5\" (186.7 cm)  Wt 261 lb 12.8 oz (119 kg)  SpO2 95%  BMI 34.07 kg/m2      Results from last 7 days  Lab Units 08/15/17  0520   HEMOGLOBIN g/dL 10.3*   HEMATOCRIT % 31.6*         Results from last 7 days  Lab Units 08/15/17  0520   SODIUM mmol/L 137   POTASSIUM mmol/L 3.7   CHLORIDE mmol/L 98   CO2 mmol/L 25.3   BUN mg/dL 20   CREATININE mg/dL 0.99   GLUCOSE mg/dL 219*   CALCIUM mg/dL 8.2*       Imaging Results (last 24 hours)     ** No results found for the last 24 hours. **          Patient Care Team:  Elliott Sun MD as PCP - General (Family Medicine)  Dionte Martinez MD as PCP - Claims Attributed  Anis Nasir Heaton MD (Cardiology)    SUBJECTIVE  Doing well  PHYSICAL EXAM  Wound looks good   Active Problems:    Osteoarthritis, knee      PLAN / DISPOSITION:  D/C with  in   KAZ Reyna  08/16/17  8:42 AM    "

## 2017-08-16 NOTE — THERAPY TREATMENT NOTE
Acute Care - Physical Therapy Treatment Note  [facility]     Patient Name: Raheem Clay  : 1945  MRN: [id number]  Today's Date: 2017  Onset of Illness/Injury or Date of Surgery Date: 08/15/17  Date of Referral to PT: 17  Referring Physician: Elliott Slaughter    Admit Date: 2017    Visit Dx:    ICD-10-CM ICD-9-CM   1. 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     V43.65     Patient Active Problem List   Diagnosis   • Osteoarthritis, knee               Adult Rehabilitation Note       17 1000 08/15/17 1358       Rehab Assessment/Intervention    Discipline physical therapy assistant  -RW physical therapy assistant  -RW     Document Type therapy note (daily note)  -RW therapy note (daily note)  -RW     Subjective Information agree to therapy  -RW agree to therapy  -RW     Patient Effort, Rehab Treatment good  -RW good  -RW     Precautions/Limitations  fall precautions  -RW     Recorded by [RW] Shelli Rhodes PTA [RW] Shelli Rhodes PTA     Pain Assessment    Pain Assessment 0-10  -RW 0-10  -RW     Pain Score 3  -RW 5  -RW     Pain Type Acute pain;Surgical pain  -RW Acute pain;Surgical pain  -RW     Pain Location Knee  -RW Knee  -RW     Pain Orientation Left  -RW Left  -RW     Pain Intervention(s)  Medication (See MAR);Repositioned;Ambulation/increased activity  -RW     Response to Interventions  tolerated  -RW     Recorded by [RW] Shelli Rhodes PTA [RW] Shelli Rhodes PTA     Cognitive Assessment/Intervention    Current Cognitive/Communication Assessment functional  -RW functional  -RW     Orientation Status oriented x 4  -RW oriented x 4  -RW     Follows Commands/Answers Questions 100% of the time  -% of the time  -RW     Personal Safety WNL/WFL  -RW WNL/WFL  -RW     Personal Safety Interventions fall prevention program maintained;gait belt;nonskid shoes/slippers when out of bed  -RW fall prevention program maintained;gait belt;nonskid shoes/slippers when out of bed  -RW      Recorded by [RW] Shelli Rhodes PTA [RW] Shelli Rhodes PTA     ROM (Range of Motion)    General ROM Detail L knee 11-90'  -RW L knee 7-86'  -RW     Recorded by [RW] Shelli Rhodes PTA [RW] Shelli Rhodes PTA     Bed Mobility, Assessment/Treatment    Bed Mob, Supine to Sit, [place] not tested  -RW not tested  -RW     Bed Mob, Sit to Supine, [place] not tested  -RW not tested  -RW     Bed Mobility, Comment Pt up in chair  -RW Pt up in chair  -RW     Recorded by [RW] Shelli Rhodes PTA [RW] Shelli Rhodes PTA     Transfer Assessment/Treatment    Transfers, Sit-Stand [place] contact guard assist  -RW minimum assist (75% patient effort)  -RW     Transfers, Stand-Sit [place] stand by assist  -RW contact guard assist  -RW     Transfers, Sit-Stand-Sit, Assist Device rolling walker  -RW rolling walker  -RW     Transfer, Safety Issues  impulsivity  -RW     Transfer, Comment  Pt attempted standing prior to RW being in front of pt and pt had a LOB backwards into chair. [place] required to safely sit back in chair  -RW     Recorded by [RW] Shelli Rhodes PTA [RW] Shelli Rhodes PTA     Gait Assessment/Treatment    Gait, [place] Level stand by assist  -RW stand by assist  -RW     Gait, Assistive Device rolling walker  -RW rolling walker  -RW     Gait, Distance (Feet) 240  -  -RW     Gait, Gait Pattern Analysis swing-through gait  -RW swing-through gait  -RW     Gait, Gait Deviations forward flexed posture;left:;antalgic;hi decreased  -RW forward flexed posture;left:;antalgic;hi decreased  -RW     Recorded by [RW] Shelli Rhodes PTA [RW] Shelli Rhodes PTA     Stairs Assessment/Treatment    Number of Stairs  4  -RW     Stairs, Handrail Location  both sides  -RW     Stairs, [place] Level  contact guard assist;verbal cues required  -RW     Stairs, Technique Used  step to step (ascending);step to step (descending)  -RW     Recorded by  [RW] Shelli Rhodes PTA      Therapy Exercises    Exercise Protocols total knee  -RW total knee  -RW     Total Knee Exercises left:;20 reps;completed protocol  -RW left:;15 reps;completed protocol  -RW     Recorded by [RW] Shelli Rhodes PTA [RW] Shelli Rhodes PTA     Positioning and Restraints    Pre-Treatment Position sitting in chair/recliner  -RW sitting in chair/recliner  -RW     Post Treatment Position chair  -RW chair  -RW     In Chair reclined;call light within reach;encouraged to call for assist;with nsg  -RW reclined;call light within reach;encouraged to call for assist  -RW     Recorded by [RW] Shelli Rhodes PTA [RW] Shelli Rhodes PTA       User Key  (r) = Recorded By, (t) = Taken By, (c) = Cosigned By    Initials Name Effective Dates     Shelli Rhodes PTA 04/06/16 -                 IP PT Goals       08/15/17 1004          Bed Mobility PT LTG    Bed Mobility PT LTG, Date Established 08/15/17  -MS      Bed Mobility PT LTG, Time to Achieve 3 days  -MS      Bed Mobility PT LTG, Activity Type all bed mobility  -MS      Bed Mobility PT LTG, [place] Level independent  -MS      Transfer Training PT LTG    Transfer Training PT LTG, Date Established 08/15/17  -MS      Transfer Training PT LTG, Time to Achieve 3 days  -MS      Transfer Training PT LTG, Activity Type all transfers  -MS      Transfer Training PT LTG, [place] Level independent  -MS      Transfer Training PT LTG, Assist Device walker, rolling  -MS      Gait Training PT LTG    Gait Training Goal PT LTG, Date Established 08/15/17  -MS      Gait Training Goal PT LTG, Time to Achieve 3 days  -MS      Gait Training Goal PT LTG, [place] Level independent  -MS      Gait Training Goal PT LTG, Assist Device walker, rolling  -MS      Gait Training Goal PT LTG, Distance to Achieve 150 feet  -MS      Stair Training PT LTG    Stair Training Goal PT LTG, Date Established 08/15/17  -MS      Stair Training Goal PT LTG, Time to Achieve 3 days  -MS      Stair Training  Goal PT LTG, Number of Steps 4  -MS      Stair Training Goal PT LTG, [place] Level contact guard assist  -MS      Stair Training Goal PT LTG, Assist Device 1 handrail  -MS      Range of Motion PT LTG    Range of Motion Goal PT LTG, Date Established 08/15/17  -MS      Range of Motion Goal PT LTG, Time to Achieve 3 days  -MS      Range fo Motion Goal PT LTG, Joint L knee  -MS      Range of Motion Goal PT LTG, AROM Measure (5, 85)  -MS        User Key  (r) = Recorded By, (t) = Taken By, (c) = Cosigned By    Initials Name Provider Type    MS Sanjay L Clive, PT Physical Therapist          Physical Therapy Education     Title: PT OT SLP Therapies (Done)     Topic: Physical Therapy (Done)     Point: Mobility training (Done)    Learning Progress Summary    Learner Readiness Method Response Comment Documented by Status   Patient Acceptance E,TB,D VU,NR   08/16/17 1010 Done    Acceptance E,D NR,VU  MS 08/15/17 1003 Done               Point: Home exercise program (Done)    Learning Progress Summary    Learner Readiness Method Response Comment Documented by Status   Patient Acceptance E,TB,D VU,NR   08/16/17 1010 Done    Acceptance E,D NR,VU  MS 08/15/17 1003 Done               Point: Body mechanics (Done)    Learning Progress Summary    Learner Readiness Method Response Comment Documented by Status   Patient Acceptance E,TB,D VU,NR   08/16/17 1010 Done    Acceptance E,D NR,VU  MS 08/15/17 1003 Done               Point: Precautions (Done)    Learning Progress Summary    Learner Readiness Method Response Comment Documented by Status   Patient Acceptance E,TB,D VU,NR   08/16/17 1010 Done    Acceptance E,D NR,VU  MS 08/15/17 1003 Done                      User Key     Initials Effective Dates Name Provider Type Discipline    MS 12/01/15 -  Sanjay Duffy, PT Physical Therapist PT    RW 04/06/16 -  Shelli Rhodes, PTA Physical Therapy Assistant PT                    PT Recommendation and Plan  Anticipated  Equipment Needs At Discharge:  (Pt. already owns a RWX for home use.)  Anticipated Discharge Disposition: home with assist, home with home health  Planned Therapy Interventions: balance training, bed mobility training, gait training, home exercise program, patient/family education, postural re-education, ROM (Range of Motion), strengthening, transfer training  PT Frequency: 2 times/day  Plan of Care Review  Plan Of Care Reviewed With: patient  Outcome Summary/Follow up Plan: Pt increased ambulation distance and exercise tolerance. Requiring less assist for all mobility and progressing well w/ PT.           Outcome Measures       08/16/17 1000 08/15/17 1000       How much help from another person do you currently need...    Turning from your back to your side while in flat bed without using bedrails? 4  -RW 4  -MS     Moving from lying on back to sitting on the side of a flat bed without bedrails? 4  -RW 3  -MS     Moving to and from a bed to a chair (including a wheelchair)? 3  -RW 3  -MS     Standing up from a chair using your arms (e.g., wheelchair, bedside chair)? 3  -RW 3  -MS     Climbing 3-5 steps with a railing? 3  -RW 3  -MS     To walk in hospital room? 3  -RW 3  -MS     AM-PAC 6 Clicks Score 20  -RW 19  -MS     Functional Assessment    Outcome Measure Options AM-PAC 6 Clicks Basic Mobility (PT)  -RW AM-PAC 6 Clicks Basic Mobility (PT)  -MS       User Key  (r) = Recorded By, (t) = Taken By, (c) = Cosigned By    Initials Name Provider Type    MS Sanjay Duffy, PT Physical Therapist    NORMA Rhodes, PTA Physical Therapy Assistant           Time Calculation:         PT Charges       08/16/17 1008          Time Calculation    Start Time 0933  -RW      Stop Time 1000  -RW      Time Calculation (min) 27 min  -RW      PT Received On 08/16/17  -RW      PT - Next Appointment 08/16/17  -RW        User Key  (r) = Recorded By, (t) = Taken By, (c) = Cosigned By    Initials Name Provider Type    NORMA PERRIN  JACK Rhodes Physical Therapy Assistant          Therapy Charges for Today     Code Description Service Date Service Provider Modifiers Qty    62477915504 HC PT THER PROC EA 15 MIN 8/15/2017 Shelli Rhodes, JACK GP 1    14115348019 HC PT THER PROC GROUP 8/15/2017 Shelli Rhodes PTA GP 1    04282605092 HC PT THER PROC EA 15 MIN 8/16/2017 Shelli Rhodes PTA GP 1    30820822331 HC PT THER PROC GROUP 8/16/2017 Shelli Rhodes, JCAK GP 1          PT G-Codes  Outcome Measure Options: AM-PAC 6 Clicks Basic Mobility (PT)    Shelli Rhodes PTA  8/16/2017

## 2017-08-16 NOTE — PLAN OF CARE
Problem: Patient Care Overview (Adult)  Goal: Plan of Care Review  Outcome: Ongoing (interventions implemented as appropriate)    08/16/17 1010   Coping/Psychosocial Response Interventions   Plan Of Care Reviewed With patient   Outcome Evaluation   Outcome Summary/Follow up Plan Pt increased ambulation distance and exercise tolerance. Requiring less assist for all mobility and progressing well w/ PT.

## 2017-08-16 NOTE — PLAN OF CARE
Problem: Patient Care Overview (Adult)  Goal: Plan of Care Review  Outcome: Ongoing (interventions implemented as appropriate)    08/16/17 0310   Coping/Psychosocial Response Interventions   Plan Of Care Reviewed With patient   Patient Care Overview   Progress improving   Outcome Evaluation   Outcome Summary/Follow up Plan Pt has continued to improve toward discharge. Ambulating well with assistance and walker use. Voiding per BRP or urinal. Pain well controlled with Percocets. Educated pt on management of SA with proper use of CPAP.        Goal: Adult Individualization and Mutuality  Outcome: Ongoing (interventions implemented as appropriate)  Goal: Discharge Needs Assessment  Outcome: Ongoing (interventions implemented as appropriate)    Problem: Fall Risk (Adult)  Goal: Absence of Falls  Outcome: Ongoing (interventions implemented as appropriate)    08/16/17 0310   Fall Risk (Adult)   Absence of Falls achieves outcome         Problem: Knee Replacement, Total (Adult)  Goal: Signs and Symptoms of Listed Potential Problems Will be Absent or Manageable (Knee Replacement, Total)  Outcome: Ongoing (interventions implemented as appropriate)    08/16/17 0310   Knee Replacement, Total   Problems Assessed (Total Knee Replacement) all   Problems Present (Total Knee Replacement) pain;decreased range of motion

## 2017-08-17 VITALS
DIASTOLIC BLOOD PRESSURE: 71 MMHG | WEIGHT: 261.8 LBS | SYSTOLIC BLOOD PRESSURE: 130 MMHG | TEMPERATURE: 99.4 F | HEART RATE: 95 BPM | RESPIRATION RATE: 16 BRPM | BODY MASS INDEX: 33.6 KG/M2 | OXYGEN SATURATION: 94 % | HEIGHT: 74 IN

## 2017-08-17 LAB
BACTERIA SPEC AEROBE CULT: NORMAL
GLUCOSE BLDC GLUCOMTR-MCNC: 140 MG/DL (ref 70–130)
GRAM STN SPEC: NORMAL
GRAM STN SPEC: NORMAL

## 2017-08-17 PROCEDURE — 82962 GLUCOSE BLOOD TEST: CPT

## 2017-08-17 PROCEDURE — 97150 GROUP THERAPEUTIC PROCEDURES: CPT

## 2017-08-17 PROCEDURE — 97110 THERAPEUTIC EXERCISES: CPT

## 2017-08-17 RX ORDER — OXYCODONE HYDROCHLORIDE AND ACETAMINOPHEN 5; 325 MG/1; MG/1
1-2 TABLET ORAL EVERY 4 HOURS PRN
Qty: 100 TABLET | Refills: 0 | Status: SHIPPED | OUTPATIENT
Start: 2017-08-17 | End: 2017-08-24

## 2017-08-17 RX ADMIN — GLIPIZIDE 5 MG: 5 TABLET ORAL at 08:08

## 2017-08-17 RX ADMIN — HYDROCHLOROTHIAZIDE 25 MG: 25 TABLET ORAL at 08:08

## 2017-08-17 RX ADMIN — OXYCODONE HYDROCHLORIDE AND ACETAMINOPHEN 2 TABLET: 5; 325 TABLET ORAL at 09:27

## 2017-08-17 RX ADMIN — ASPIRIN 325 MG: 325 TABLET, DELAYED RELEASE ORAL at 08:08

## 2017-08-17 RX ADMIN — OXYCODONE HYDROCHLORIDE AND ACETAMINOPHEN 2 TABLET: 5; 325 TABLET ORAL at 05:05

## 2017-08-17 RX ADMIN — METFORMIN HYDROCHLORIDE 1000 MG: 1000 TABLET ORAL at 08:08

## 2017-08-17 RX ADMIN — DOCUSATE SODIUM -SENNOSIDES 2 TABLET: 50; 8.6 TABLET, COATED ORAL at 08:07

## 2017-08-17 RX ADMIN — METOPROLOL TARTRATE 50 MG: 50 TABLET, FILM COATED ORAL at 08:07

## 2017-08-17 RX ADMIN — LISINOPRIL 40 MG: 40 TABLET ORAL at 08:08

## 2017-08-17 RX ADMIN — ATORVASTATIN CALCIUM 80 MG: 80 TABLET, FILM COATED ORAL at 08:08

## 2017-08-17 RX ADMIN — OXYCODONE HYDROCHLORIDE AND ACETAMINOPHEN 2 TABLET: 5; 325 TABLET ORAL at 01:05

## 2017-08-17 RX ADMIN — FERROUS SULFATE TAB 325 MG (65 MG ELEMENTAL FE) 325 MG: 325 (65 FE) TAB at 08:08

## 2017-08-17 NOTE — PLAN OF CARE
Problem: Patient Care Overview (Adult)  Goal: Plan of Care Review  Outcome: Ongoing (interventions implemented as appropriate)    08/17/17 1024   Coping/Psychosocial Response Interventions   Plan Of Care Reviewed With patient   Outcome Evaluation   Outcome Summary/Follow up Plan Pt required less assist w/ all activity today. Plans for d/c home and has no questions for PT at this time.

## 2017-08-17 NOTE — THERAPY TREATMENT NOTE
Acute Care - Physical Therapy Treatment Note  [facility]     Patient Name: Raheem Clay  : 1945  MRN: [id number]  Today's Date: 2017  Onset of Illness/Injury or Date of Surgery Date: 08/15/17  Date of Referral to PT: 17  Referring Physician: Elliott Slaughter    Admit Date: 2017    Visit Dx:    ICD-10-CM ICD-9-CM   1. 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     V43.65     Patient Active Problem List   Diagnosis   • Osteoarthritis, knee               Adult Rehabilitation Note       17 1000 17 1400 17 1000    Rehab Assessment/Intervention    Discipline physical therapy assistant  -RW physical therapy assistant  -RW physical therapy assistant  -RW    Document Type therapy note (daily note)  -RW therapy note (daily note)  -RW therapy note (daily note)  -RW    Subjective Information agree to therapy  -RW agree to therapy  -RW agree to therapy  -RW    Patient Effort, Rehab Treatment good  -RW good  -RW good  -RW    Recorded by [RW] Shelli Rhodes PTA [RW] Shelli Rhodes, JACK [RW] Shelli Rhodes PTA    Pain Assessment    Pain Assessment 0-10  -RW 0-10  -RW 0-10  -RW    Pain Score 2  -RW 3  -RW 3  -RW    Pain Type Acute pain;Surgical pain  -RW Acute pain;Surgical pain  -RW Acute pain;Surgical pain  -RW    Pain Location Knee  -RW Knee  -RW Knee  -RW    Pain Orientation Left  -RW Left  -RW Left  -RW    Recorded by [RW] Shelli Rhodes PTA [RW] Shelli Rhodes PTA [RW] Shelli Rhodes PTA    Cognitive Assessment/Intervention    Current Cognitive/Communication Assessment functional  -RW functional  -RW functional  -RW    Orientation Status oriented x 4  -RW oriented x 4  -RW oriented x 4  -RW    Follows Commands/Answers Questions 100% of the time  -% of the time  -% of the time  -RW    Personal Safety WNL/WFL  -RW WNL/WFL  -RW WNL/WFL  -RW    Personal Safety Interventions fall prevention program maintained;gait belt;nonskid shoes/slippers when out of bed  -RW  fall prevention program maintained;gait belt;nonskid shoes/slippers when out of bed  -RW fall prevention program maintained;gait belt;nonskid shoes/slippers when out of bed  -RW    Recorded by [RW] Shelli Rhodes PTA [RW] Shelli Rhodes, PTA [RW] Shelli Rhodes PTA    ROM (Range of Motion)    General ROM Detail L knee 10-90'  -RW  L knee 11-90'  -RW    Recorded by [RW] Shelli Rhodes, JACK  [RW] Shelli Rhodes PTA    Bed Mobility, Assessment/Treatment    Bed Mob, Supine to Sit, Snyder not tested  -RW not tested  -RW not tested  -RW    Bed Mob, Sit to Supine, Snyder not tested  -RW not tested  -RW not tested  -RW    Bed Mobility, Comment Pt up in chair  -RW Pt up in chair  -RW Pt up in chair  -RW    Recorded by [RW] Shelli Rhodes, PTA [RW] Shelli Rhodes, PTA [RW] Shelli Rhodes PTA    Transfer Assessment/Treatment    Transfers, Sit-Stand Snyder supervision required  -RW stand by assist  -RW contact guard assist  -RW    Transfers, Stand-Sit Snyder supervision required  -RW stand by assist  -RW stand by assist  -RW    Transfers, Sit-Stand-Sit, Assist Device rolling walker  -RW rolling walker  -RW rolling walker  -RW    Recorded by [RW] Shelli Rhodes PTA [RW] Shelli Rhodes, PTA [RW] Shelli Rhodes PTA    Gait Assessment/Treatment    Gait, Snyder Level supervision required  -RW stand by assist  -RW stand by assist  -RW    Gait, Assistive Device rolling walker  -RW rolling walker  -RW rolling walker  -RW    Gait, Distance (Feet) 240  -  -  -RW    Gait, Gait Pattern Analysis swing-through gait  -RW swing-through gait  -RW swing-through gait  -RW    Gait, Gait Deviations forward flexed posture;left:;antalgic  -RW forward flexed posture;left:;antalgic;hi decreased  -RW forward flexed posture;left:;antalgic;hi decreased  -RW    Recorded by [RW] Shelli Rhodes, PTA [RW] Shelli Rhodes, PTA [RW] Shelli Rhodes PTA    Therapy Exercises    Exercise  Protocols total knee  -RW total knee  -RW total knee  -RW    Total Knee Exercises left:;30 reps;completed protocol  -RW left:;25 reps;completed protocol  -RW left:;20 reps;completed protocol  -RW    Recorded by [RW] Shelli Rhodes PTA [RW] Shelli Rhodes PTA [RW] Sehlli Rhodes PTA    Positioning and Restraints    Pre-Treatment Position sitting in chair/recliner  -RW sitting in chair/recliner  -RW sitting in chair/recliner  -RW    Post Treatment Position chair  -RW chair  -RW chair  -RW    In Chair reclined;call light within reach;encouraged to call for assist  -RW reclined;call light within reach;encouraged to call for assist   ice applied to L knee  -RW reclined;call light within reach;encouraged to call for assist;with nsg  -RW    Recorded by [RW] Shelli Rhodes PTA [RW] Shelli Rhodes PTA [RW] Shelli Rhodes PTA      08/15/17 6412          Rehab Assessment/Intervention    Discipline physical therapy assistant  -RW      Document Type therapy note (daily note)  -RW      Subjective Information agree to therapy  -RW      Patient Effort, Rehab Treatment good  -RW      Precautions/Limitations fall precautions  -RW      Recorded by [RW] Shelli Rhodes PTA      Pain Assessment    Pain Assessment 0-10  -RW      Pain Score 5  -RW      Pain Type Acute pain;Surgical pain  -RW      Pain Location Knee  -RW      Pain Orientation Left  -RW      Pain Intervention(s) Medication (See MAR);Repositioned;Ambulation/increased activity  -RW      Response to Interventions tolerated  -RW      Recorded by [RW] Shelli Rhodes PTA      Cognitive Assessment/Intervention    Current Cognitive/Communication Assessment functional  -RW      Orientation Status oriented x 4  -RW      Follows Commands/Answers Questions 100% of the time  -RW      Personal Safety WNL/WFL  -RW      Personal Safety Interventions fall prevention program maintained;gait belt;nonskid shoes/slippers when out of bed  -RW      Recorded by [RW] Shelli Rhodes  PTA      ROM (Range of Motion)    General ROM Detail L knee 7-86'  -RW      Recorded by [RW] Shelli Rhodes PTA      Bed Mobility, Assessment/Treatment    Bed Mob, Supine to Sit, Bee not tested  -RW      Bed Mob, Sit to Supine, Bee not tested  -RW      Bed Mobility, Comment Pt up in chair  -RW      Recorded by [RW] Shelli Rhodes PTA      Transfer Assessment/Treatment    Transfers, Sit-Stand Bee minimum assist (75% patient effort)  -RW      Transfers, Stand-Sit Bee contact guard assist  -RW      Transfers, Sit-Stand-Sit, Assist Device rolling walker  -RW      Transfer, Safety Issues impulsivity  -RW      Transfer, Comment Pt attempted standing prior to RW being in front of pt and pt had a LOB backwards into chair. Pierre required to safely sit back in chair  -RW      Recorded by [RW] Shelli Rhodes PTA      Gait Assessment/Treatment    Gait, Bee Level stand by assist  -RW      Gait, Assistive Device rolling walker  -RW      Gait, Distance (Feet) 150  -RW      Gait, Gait Pattern Analysis swing-through gait  -RW      Gait, Gait Deviations forward flexed posture;left:;antalgic;hi decreased  -RW      Recorded by [RW] Shelli Rhodes PTA      Stairs Assessment/Treatment    Number of Stairs 4  -RW      Stairs, Handrail Location both sides  -RW      Stairs, Bee Level contact guard assist;verbal cues required  -RW      Stairs, Technique Used step to step (ascending);step to step (descending)  -RW      Recorded by [RW] Shelli Rhodes PTA      Therapy Exercises    Exercise Protocols total knee  -RW      Total Knee Exercises left:;15 reps;completed protocol  -RW      Recorded by [RW] Shelli Rhodes PTA      Positioning and Restraints    Pre-Treatment Position sitting in chair/recliner  -RW      Post Treatment Position chair  -RW      In Chair reclined;call light within reach;encouraged to call for assist  -RW      Recorded by [RW] Shelli Rhodes PTA         User Key  (r) = Recorded By, (t) = Taken By, (c) = Cosigned By    Initials Name Effective Dates    RW Shelli Rhodes, PTA 04/06/16 -                 IP PT Goals       08/15/17 1004          Bed Mobility PT LTG    Bed Mobility PT LTG, Date Established 08/15/17  -MS      Bed Mobility PT LTG, Time to Achieve 3 days  -MS      Bed Mobility PT LTG, Activity Type all bed mobility  -MS      Bed Mobility PT LTG, Pleasants Level independent  -MS      Transfer Training PT LTG    Transfer Training PT LTG, Date Established 08/15/17  -MS      Transfer Training PT LTG, Time to Achieve 3 days  -MS      Transfer Training PT LTG, Activity Type all transfers  -MS      Transfer Training PT LTG, Pleasants Level independent  -MS      Transfer Training PT LTG, Assist Device walker, rolling  -MS      Gait Training PT LTG    Gait Training Goal PT LTG, Date Established 08/15/17  -MS      Gait Training Goal PT LTG, Time to Achieve 3 days  -MS      Gait Training Goal PT LTG, Pleasants Level independent  -MS      Gait Training Goal PT LTG, Assist Device walker, rolling  -MS      Gait Training Goal PT LTG, Distance to Achieve 150 feet  -MS      Stair Training PT LTG    Stair Training Goal PT LTG, Date Established 08/15/17  -MS      Stair Training Goal PT LTG, Time to Achieve 3 days  -MS      Stair Training Goal PT LTG, Number of Steps 4  -MS      Stair Training Goal PT LTG, Pleasants Level contact guard assist  -MS      Stair Training Goal PT LTG, Assist Device 1 handrail  -MS      Range of Motion PT LTG    Range of Motion Goal PT LTG, Date Established 08/15/17  -MS      Range of Motion Goal PT LTG, Time to Achieve 3 days  -MS      Range fo Motion Goal PT LTG, Joint L knee  -MS      Range of Motion Goal PT LTG, AROM Measure (5, 85)  -MS        User Key  (r) = Recorded By, (t) = Taken By, (c) = Cosigned By    Initials Name Provider Type    MS Sanjay Duffy, PT Physical Therapist          Physical Therapy Education     Title: PT  OT SLP Therapies (Resolved)     Topic: Physical Therapy (Resolved)     Point: Mobility training (Resolved)    Learning Progress Summary    Learner Readiness Method Response Comment Documented by Status   Patient Acceptance E,TB,D VU,DU  RW 08/17/17 1029 Done    Acceptance E,TB,D VU,NR  RW 08/16/17 1010 Done    Acceptance E,D NR,VU  MS 08/15/17 1003 Done               Point: Home exercise program (Resolved)    Learning Progress Summary    Learner Readiness Method Response Comment Documented by Status   Patient Acceptance E,TB,D VU,DU  RW 08/17/17 1029 Done    Acceptance E,TB,D VU,NR  RW 08/16/17 1010 Done    Acceptance E,D NR,VU  MS 08/15/17 1003 Done               Point: Body mechanics (Resolved)    Learning Progress Summary    Learner Readiness Method Response Comment Documented by Status   Patient Acceptance E,TB,D VU,DU  RW 08/17/17 1029 Done    Acceptance E,TB,D VU,NR   08/16/17 1010 Done    Acceptance E,D NR,VU  MS 08/15/17 1003 Done               Point: Precautions (Resolved)    Learning Progress Summary    Learner Readiness Method Response Comment Documented by Status   Patient Acceptance E,TB,D VU,DU  RW 08/17/17 1029 Done    Acceptance E,TB,D VU,NR   08/16/17 1010 Done    Acceptance E,D NR,VU  MS 08/15/17 1003 Done                      User Key     Initials Effective Dates Name Provider Type Discipline    MS 12/01/15 -  Sanjay Duffy, PT Physical Therapist PT     04/06/16 -  Shelli Rhodes, PTA Physical Therapy Assistant PT                    PT Recommendation and Plan  Anticipated Equipment Needs At Discharge:  (Pt. already owns a RWX for home use.)  Anticipated Discharge Disposition: home with assist, home with home health  Planned Therapy Interventions: balance training, bed mobility training, gait training, home exercise program, patient/family education, postural re-education, ROM (Range of Motion), strengthening, transfer training  PT Frequency: 2 times/day  Plan of Care Review  Plan Of Care  Reviewed With: patient  Outcome Summary/Follow up Plan: Pt required less assist w/ all activity today. Plans for d/c home and has no questions for PT at this time.           Outcome Measures       08/17/17 1000 08/16/17 1000 08/15/17 1000    How much help from another person do you currently need...    Turning from your back to your side while in flat bed without using bedrails? 4  -RW 4  -RW 4  -MS    Moving from lying on back to sitting on the side of a flat bed without bedrails? 4  -RW 4  -RW 3  -MS    Moving to and from a bed to a chair (including a wheelchair)? 4  -RW 3  -RW 3  -MS    Standing up from a chair using your arms (e.g., wheelchair, bedside chair)? 4  -RW 3  -RW 3  -MS    Climbing 3-5 steps with a railing? 3  -RW 3  -RW 3  -MS    To walk in hospital room? 4  -RW 3  -RW 3  -MS    AM-PAC 6 Clicks Score 23  -RW 20  -RW 19  -MS    Functional Assessment    Outcome Measure Options AM-PAC 6 Clicks Basic Mobility (PT)  -RW AM-PAC 6 Clicks Basic Mobility (PT)  -RW AM-PAC 6 Clicks Basic Mobility (PT)  -MS      User Key  (r) = Recorded By, (t) = Taken By, (c) = Cosigned By    Initials Name Provider Type    MS Sanjay Duffy, PT Physical Therapist    RW Shelli Rhodes PTA Physical Therapy Assistant           Time Calculation:         PT Charges       08/17/17 1022          Time Calculation    Start Time 0933  -RW      Stop Time 1005  -RW      Time Calculation (min) 32 min  -RW      PT Received On 08/17/17  -RW        User Key  (r) = Recorded By, (t) = Taken By, (c) = Cosigned By    Initials Name Provider Type    RW Shelli Rhodes PTA Physical Therapy Assistant          Therapy Charges for Today     Code Description Service Date Service Provider Modifiers Qty    81528073091 HC PT THER PROC EA 15 MIN 8/16/2017 Shelli Rhodes PTA GP 1    36196159361 HC PT THER PROC GROUP 8/16/2017 Shelli Rhodes PTA GP 1    87998031182 HC PT THER PROC EA 15 MIN 8/16/2017 Shelli Rhodes PTA GP 1    77049624214 HC PT THER  PROC GROUP 8/16/2017 Shelli Rhodes, PTA GP 1    56370193581 HC PT THER PROC EA 15 MIN 8/17/2017 Shelli Rhodes, PTA GP 1    12509910477 HC PT THER PROC GROUP 8/17/2017 Shelli Rhodes, JACK GP 1          PT G-Codes  Outcome Measure Options: AM-PAC 6 Clicks Basic Mobility (PT)    Shelli Rhodes PTA  8/17/2017

## 2017-08-17 NOTE — PROGRESS NOTES
"/70 (BP Location: Left arm, Patient Position: Lying)  Pulse 78  Temp 96.2 °F (35.7 °C) (Oral)   Resp 16  Ht 73.5\" (186.7 cm)  Wt 261 lb 12.8 oz (119 kg)  SpO2 90%  BMI 34.07 kg/m2    Lab Results (last 24 hours)     Procedure Component Value Units Date/Time    Wound Culture [[id number]]  (Normal) Collected:  08/14/17 1539    Specimen:  Wound from Knee, Left Updated:  08/16/17 0751     Wound Culture No growth at 2 days     Gram Stain Result Occasional WBCs seen      No organisms seen    POC Glucose Fingerstick [[id number]]  (Abnormal) Collected:  08/16/17 1021    Specimen:  Blood Updated:  08/16/17 1022     Glucose 140 (H) mg/dL     Narrative:       Meter: [id number] : [id number] Blacketer Kiera    POC Glucose Fingerstick [[id number]]  (Normal) Collected:  08/16/17 2110    Specimen:  Blood Updated:  08/16/17 2111     Glucose 126 mg/dL     Narrative:       Meter: [id number] : [id number] Samples Rosalinda NA    POC Glucose Fingerstick [[id number]]  (Abnormal) Collected:  08/17/17 0606    Specimen:  Blood Updated:  08/17/17 0607     Glucose 140 (H) mg/dL     Narrative:       Meter: [id number] : [id number] Samples Rosalinda NA          Imaging Results (last 24 hours)     ** No results found for the last 24 hours. **          Patient Care Team:  Elliott Sun MD as PCP - General (Family Medicine)  Dionte Martinez MD as PCP - Claims Attributed  Anis Nasir Heaton MD (Cardiology)    SUBJECTIVE  Doing well  PHYSICAL EXAM   Wound fine  Active Problems:    Osteoarthritis, knee      PLAN / DISPOSITION:  HH discharge today  Elliott Murphy MD  08/17/17  6:36 AM      "

## 2017-08-17 NOTE — DISCHARGE SUMMARY
Discharge Summary   Elliott Murphy M.D.    NAME: Raheem Clay ADMIT: 2017   : 1945  PCP: Elliott Sun MD    MRN: [id number] LOS: 3 days   AGE/SEX: 72 y.o. male  ROOM: P782/1       Date of Discharge:  17    Primary Discharge Diagnosis:  Osteoarthritis, knee [M17.9]    Secondary Discharge Diagnosis:    Problem List Items Addressed This Visit     None      Visit Diagnoses     Failed total knee, left        Relevant Orders    Wound Culture (Completed)    Referral to home health    Activity as tolerated    Discharge dressing/ wound instructions          Procedures Performed:  Left Total Knee Arthroplasty    Hospital Course:    Raheem Clay is a 72 y.o.  male who underwent successful Left Total Knee Arthroplasty on 2017.  Raheem Clay was started on Aspirin 325mg po daily immediately post-operatively for DVT prophylaxis.  On post-op day 1 the patients dressing was changed, drain removed and their incision was clean, with no signs of infection and their calf was soft, with no signs of DVT.  The patient progressed well with physical therapy and the patients hemoglobin remained stable. On post-operative day 3 the patient was felt ready for discharge.      Total Knee Joint Replacement Discharge Instructions:    I. ACTIVITIES:    1. Exercises:  ? Complete exercise program as taught by the hospital physical therapist 2 times per day  ? Exercise program will be advanced by the physical therapist  ? During the day be up ambulating every 2 hours (while awake) for short distances  ? Complete the ankle pump exercises at least 10 times per hour (while awake)  ? Elevate legs most of the day the first week post operatively and thereafter elevate legs when in bed and for at least 30 minutes during the day. Caution must be taken to avoid pillow placement under the bend of the knee as this can led to flexion contractures of the knee.  ? Use cold packs 20-30 minutes approximately 5 times per  day. This should be done before and after completing your exercises and at any time you are experiencing pain/ stiffness in your operative extremity.    2. Activities of Daily Living:  ? No tub baths, hot tubs, or swimming pools for 4 weeks  ? May shower and let water run over the incision on post-operative day #7 if no drainage. Do not scrub or rub the incision. Simply let the water run over the incision and pat dry.    II. Precautions:  ? Everyone that comes near you should wash their hands  ? No elective dental, genital-urinary, or colon procedures or surgical procedures for 12 weeks after surgery unless absolutely necessary.  ? If dental work or surgical procedure is deemed absolutely necessary during the first 12 weeks, you will need to contact your surgeon as you will need to take antibiotics 1 hour prior to any dental work (including teeth cleanings).  ? Please discuss with your surgeon prophylactic antibiotics as the length of time this intervention will be necessary for you varies with each patient’s health history and situation.  ? Avoid sick people. If you must be around someone who is ill, they should wear a mask.  ? Avoid visits to the Emergency Room or Urgent Care unless you are having a life threatening event.     III. INCISION CARE:  ? Wash your hands prior to dressing changes  ? Change the dressing as needed to keep incision clean and dry. Utilize dry gauze and paper tape. Avoid touching the side of the gauze that goes against the incision with your hands.  ? No creams or ointments to the incision  ? May remove dressing once the incision is free of drainage  ? Do not touch or pick at the incision  ? Check incision every day and notify surgeon immediately if any of the following signs or symptoms are noted:  o Increase in redness  o Increase in swelling around the incision and of the entire extremity  o Increase in pain  o Drainage oozing from the incision  o Pulling apart of the edges of the  incision  o Increase in overall body temperature (greater than 100.5 degrees)  ? Your surgeon will instruct you regarding suture or staple removal    IV. Medications:     1. Anticoagulants: You will be discharged on an anticoagulant. This is a prophylactic medication that helps prevent blood clots during your post-operative period. The type and length of dosage varies based on your individual needs, procedure performed, and surgeon’s preference.    ? While taking the anticoagulant, you should avoid taking any additional aspirin, ibuprofen (Advil or Motrin), Aleve (Naprosyn) or other non-steroidal anti-inflammatory medications.   ? Notify surgeon immediately if any gracie bleeding is noted in the urine, stool, emesis, or from the nose or the incision. Blood in the stool will often appear as black rather than red. Blood in urine may appear as pink. Blood in emesis may appear as brown/black like coffee grounds.  ? You will need to apply pressure for longer periods of time to any cuts or abrasions to stop bleeding  ? Avoid alcohol while taking anticoagulants    2. Stool Softeners: You will be at greater risk of constipation after surgery due to being less mobile and the pain medications.     ? Take stool softeners as instructed by your surgeon while on pain medications. Bran cereal is most effective. Over the counter Colace 100 mg 1-2 capsules twice daily.   ? Drink plenty of fluids, and eat fruits and vegetables during your recovery time    3. Pain Medications utilized after surgery are narcotics and the law requires that the following information be given to all patients that are prescribed narcotics:    ? CLASSIFICATION: Pain medications are called Opioids and are narcotics  ? LEGALITIES: It is illegal to share narcotics with others and to drive within 24 hours of taking narcotics  ? POTENTIAL SIDE EFFECTS: Potential side effects of opioids include: nausea, vomiting, itching, dizziness, drowsiness, dry mouth,  constipation, and difficulty urinating.  ? POTENTIAL ADVERSE EFFECTS:   o Opioid tolerance can develop with use of pain medications and this simply means that it requires more and more of the medication to control pain; however, this is seen more in patients that use opioids for longer periods of time.  o Opioid dependence can develop with use of Opioids and this simply means that to stop the medication can cause withdrawal symptoms; however, this is seen with patients that use Opioids for longer periods of time.  o Opioid addiction can develop with use of Opioids and the incidence of this is very unlikely in patients who take the medications as ordered and stop the medications as instructed.  o Opioid overdose can be dangerous, but is unlikely when the medication is taken as ordered and stopped when ordered. It is important not to mix opioids with alcohol or with and type of sedative such as Benadryl as this can lead to over sedation and respiratory difficulty.  ? DOSAGE:   o Pain medications will need to be taken consistently for the first week to decrease pain and promote adequate pain relief and participation in physical therapy.  o After the initial surgical pain begins to resolve, you may begin to decrease the pain medication. By the end of 6-8 weeks, you should be off of pain medications.  o Refills will not be given by the office during evening hours, on weekends, or after 6-8 weeks post-op.  o To seek refills on pain medications during the initial 6 week post-operative period, you must call the office 48 hours in advance to request the refill. The office will then notify you when to  the prescription. DO NOT wait until you are out of the medication to request a refill.    V. FOLLOW-UP VISITS:  ? You will need to follow up in the office with your surgeon in 3 weeks. Please call this number [phone] to schedule this appointment.  If you have any concerns or suspected complications prior to your  follow up visit, please call your surgeons office. Do not wait until your appointment time if you suspect complications. These will need to be addressed in the office promptly.    Discharge Medications:     1) Percocet 5/325  1-2 po q 4-6 hours for pain control  2)  Aspirin 325 mg po daily for 6 weeks.      Elliott Murphy MD  8/17/2017  6:44 AM

## 2017-08-17 NOTE — PLAN OF CARE
Problem: Patient Care Overview (Adult)  Goal: Plan of Care Review  Outcome: Ongoing (interventions implemented as appropriate)    08/17/17 0316   Coping/Psychosocial Response Interventions   Plan Of Care Reviewed With patient   Patient Care Overview   Progress improving   Outcome Evaluation   Outcome Summary/Follow up Plan Pt has done well with current treatment. Ambulates well with assistance. Pain is well controlled with Percocets. Voiding per urinal or BRP. Educated on SA management with proper use of CPAP.        Goal: Adult Individualization and Mutuality  Outcome: Ongoing (interventions implemented as appropriate)  Goal: Discharge Needs Assessment  Outcome: Ongoing (interventions implemented as appropriate)    Problem: Fall Risk (Adult)  Goal: Absence of Falls  Outcome: Ongoing (interventions implemented as appropriate)    08/17/17 0316   Fall Risk (Adult)   Absence of Falls achieves outcome         Problem: Knee Replacement, Total (Adult)  Goal: Signs and Symptoms of Listed Potential Problems Will be Absent or Manageable (Knee Replacement, Total)  Outcome: Ongoing (interventions implemented as appropriate)    08/17/17 0316   Knee Replacement, Total   Problems Assessed (Total Knee Replacement) all   Problems Present (Total Knee Replacement) pain;decreased range of motion

## 2017-08-17 NOTE — THERAPY TREATMENT NOTE
Acute Care - Physical Therapy Treatment Note  [facility]     Patient Name: Raheem Clay  : 1945  MRN: [id number]  Today's Date: 2017  Onset of Illness/Injury or Date of Surgery Date: 08/15/17  Date of Referral to PT: 17  Referring Physician: Elliott Slaughter    Admit Date: 2017    Visit Dx:    ICD-10-CM ICD-9-CM   1. 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     V43.65     Patient Active Problem List   Diagnosis   • Osteoarthritis, knee               Adult Rehabilitation Note       17 1000 17 1400 17 1000    Rehab Assessment/Intervention    Discipline physical therapy assistant  -RW physical therapy assistant  -RW physical therapy assistant  -RW    Document Type therapy note (daily note)  -RW therapy note (daily note)  -RW therapy note (daily note)  -RW    Subjective Information agree to therapy  -RW agree to therapy  -RW agree to therapy  -RW    Patient Effort, Rehab Treatment good  -RW good  -RW good  -RW    Recorded by [RW] Shelli Rhodes PTA [RW] Shelli Rhodes, JACK [RW] Shelli Rhodes PTA    Pain Assessment    Pain Assessment 0-10  -RW 0-10  -RW 0-10  -RW    Pain Score 2  -RW 3  -RW 3  -RW    Pain Type Acute pain;Surgical pain  -RW Acute pain;Surgical pain  -RW Acute pain;Surgical pain  -RW    Pain Location Knee  -RW Knee  -RW Knee  -RW    Pain Orientation Left  -RW Left  -RW Left  -RW    Recorded by [RW] Shelli Rhodes PTA [RW] Shelli Rhodes PTA [RW] Shelli Rhodes PTA    Cognitive Assessment/Intervention    Current Cognitive/Communication Assessment functional  -RW functional  -RW functional  -RW    Orientation Status oriented x 4  -RW oriented x 4  -RW oriented x 4  -RW    Follows Commands/Answers Questions 100% of the time  -% of the time  -% of the time  -RW    Personal Safety WNL/WFL  -RW WNL/WFL  -RW WNL/WFL  -RW    Personal Safety Interventions fall prevention program maintained;gait belt;nonskid shoes/slippers when out of bed  -RW  fall prevention program maintained;gait belt;nonskid shoes/slippers when out of bed  -RW fall prevention program maintained;gait belt;nonskid shoes/slippers when out of bed  -RW    Recorded by [RW] Shelli Rhodes PTA [RW] Shelli Rhodes, PTA [RW] Shelli Rhodes PTA    ROM (Range of Motion)    General ROM Detail L knee 10-90'  -RW  L knee 11-90'  -RW    Recorded by [RW] Shelli Rhodes, JACK  [RW] Shelli Rhodes PTA    Bed Mobility, Assessment/Treatment    Bed Mob, Supine to Sit, [place] not tested  -RW not tested  -RW not tested  -RW    Bed Mob, Sit to Supine, [place] not tested  -RW not tested  -RW not tested  -RW    Bed Mobility, Comment Pt up in chair  -RW Pt up in chair  -RW Pt up in chair  -RW    Recorded by [RW] Shelli Rhodes, PTA [RW] Shelli Rhodes, PTA [RW] Shelli Rhodes PTA    Transfer Assessment/Treatment    Transfers, Sit-Stand [place] supervision required  -RW stand by assist  -RW contact guard assist  -RW    Transfers, Stand-Sit [place] supervision required  -RW stand by assist  -RW stand by assist  -RW    Transfers, Sit-Stand-Sit, Assist Device rolling walker  -RW rolling walker  -RW rolling walker  -RW    Recorded by [RW] Shelli Rhodes PTA [RW] Shelli Rhodes, PTA [RW] Shelli Rhodes PTA    Gait Assessment/Treatment    Gait, [place] Level supervision required  -RW stand by assist  -RW stand by assist  -RW    Gait, Assistive Device rolling walker  -RW rolling walker  -RW rolling walker  -RW    Gait, Distance (Feet) 240  -  -  -RW    Gait, Gait Pattern Analysis swing-through gait  -RW swing-through gait  -RW swing-through gait  -RW    Gait, Gait Deviations forward flexed posture;left:;antalgic  -RW forward flexed posture;left:;antalgic;hi decreased  -RW forward flexed posture;left:;antalgic;hi decreased  -RW    Recorded by [RW] Shelli Rhodes, PTA [RW] Shelli Rhodes, PTA [RW] Shelli Rhodes PTA    Therapy Exercises    Exercise  Protocols total knee  -RW total knee  -RW total knee  -RW    Total Knee Exercises left:;30 reps;completed protocol  -RW left:;25 reps;completed protocol  -RW left:;20 reps;completed protocol  -RW    Recorded by [RW] Shelli Rhodes PTA [RW] Shelli Rhodes PTA [RW] Shelli Rhodes PTA    Positioning and Restraints    Pre-Treatment Position sitting in chair/recliner  -RW sitting in chair/recliner  -RW sitting in chair/recliner  -RW    Post Treatment Position chair  -RW chair  -RW chair  -RW    In Chair reclined;call light within reach;encouraged to call for assist  -RW reclined;call light within reach;encouraged to call for assist   ice applied to L knee  -RW reclined;call light within reach;encouraged to call for assist;with nsg  -RW    Recorded by [RW] Shelli Rhodes PTA [RW] Shelli Rhodes PTA [RW] Shelli Rhodes PTA      08/15/17 4673          Rehab Assessment/Intervention    Discipline physical therapy assistant  -RW      Document Type therapy note (daily note)  -RW      Subjective Information agree to therapy  -RW      Patient Effort, Rehab Treatment good  -RW      Precautions/Limitations fall precautions  -RW      Recorded by [RW] Shelli Rhodes PTA      Pain Assessment    Pain Assessment 0-10  -RW      Pain Score 5  -RW      Pain Type Acute pain;Surgical pain  -RW      Pain Location Knee  -RW      Pain Orientation Left  -RW      Pain Intervention(s) Medication (See MAR);Repositioned;Ambulation/increased activity  -RW      Response to Interventions tolerated  -RW      Recorded by [RW] Shelli Rhodes PTA      Cognitive Assessment/Intervention    Current Cognitive/Communication Assessment functional  -RW      Orientation Status oriented x 4  -RW      Follows Commands/Answers Questions 100% of the time  -RW      Personal Safety WNL/WFL  -RW      Personal Safety Interventions fall prevention program maintained;gait belt;nonskid shoes/slippers when out of bed  -RW      Recorded by [RW] Shelli Rhodes  PTA      ROM (Range of Motion)    General ROM Detail L knee 7-86'  -RW      Recorded by [RW] Shelli Rhodes PTA      Bed Mobility, Assessment/Treatment    Bed Mob, Supine to Sit, [place] not tested  -RW      Bed Mob, Sit to Supine, [place] not tested  -RW      Bed Mobility, Comment Pt up in chair  -RW      Recorded by [RW] Shelli Rhodes PTA      Transfer Assessment/Treatment    Transfers, Sit-Stand [place] minimum assist (75% patient effort)  -RW      Transfers, Stand-Sit [place] contact guard assist  -RW      Transfers, Sit-Stand-Sit, Assist Device rolling walker  -RW      Transfer, Safety Issues impulsivity  -RW      Transfer, Comment Pt attempted standing prior to RW being in front of pt and pt had a LOB backwards into chair. [place] required to safely sit back in chair  -RW      Recorded by [RW] Shelli Rhodes PTA      Gait Assessment/Treatment    Gait, [place] Level stand by assist  -RW      Gait, Assistive Device rolling walker  -RW      Gait, Distance (Feet) 150  -RW      Gait, Gait Pattern Analysis swing-through gait  -RW      Gait, Gait Deviations forward flexed posture;left:;antalgic;hi decreased  -RW      Recorded by [RW] Shelli Rhodes PTA      Stairs Assessment/Treatment    Number of Stairs 4  -RW      Stairs, Handrail Location both sides  -RW      Stairs, [place] Level contact guard assist;verbal cues required  -RW      Stairs, Technique Used step to step (ascending);step to step (descending)  -RW      Recorded by [RW] Shelli Rhodes PTA      Therapy Exercises    Exercise Protocols total knee  -RW      Total Knee Exercises left:;15 reps;completed protocol  -RW      Recorded by [RW] Shelli Rhodes PTA      Positioning and Restraints    Pre-Treatment Position sitting in chair/recliner  -RW      Post Treatment Position chair  -RW      In Chair reclined;call light within reach;encouraged to call for assist  -RW      Recorded by [RW] Shelli Rhodes PTA         User Key  (r) = Recorded By, (t) = Taken By, (c) = Cosigned By    Initials Name Effective Dates    RW Shelli Rhodes, PTA 04/06/16 -                 IP PT Goals       08/15/17 1004          Bed Mobility PT LTG    Bed Mobility PT LTG, Date Established 08/15/17  -MS      Bed Mobility PT LTG, Time to Achieve 3 days  -MS      Bed Mobility PT LTG, Activity Type all bed mobility  -MS      Bed Mobility PT LTG, [place] Level independent  -MS      Transfer Training PT LTG    Transfer Training PT LTG, Date Established 08/15/17  -MS      Transfer Training PT LTG, Time to Achieve 3 days  -MS      Transfer Training PT LTG, Activity Type all transfers  -MS      Transfer Training PT LTG, [place] Level independent  -MS      Transfer Training PT LTG, Assist Device walker, rolling  -MS      Gait Training PT LTG    Gait Training Goal PT LTG, Date Established 08/15/17  -MS      Gait Training Goal PT LTG, Time to Achieve 3 days  -MS      Gait Training Goal PT LTG, [place] Level independent  -MS      Gait Training Goal PT LTG, Assist Device walker, rolling  -MS      Gait Training Goal PT LTG, Distance to Achieve 150 feet  -MS      Stair Training PT LTG    Stair Training Goal PT LTG, Date Established 08/15/17  -MS      Stair Training Goal PT LTG, Time to Achieve 3 days  -MS      Stair Training Goal PT LTG, Number of Steps 4  -MS      Stair Training Goal PT LTG, [place] Level contact guard assist  -MS      Stair Training Goal PT LTG, Assist Device 1 handrail  -MS      Range of Motion PT LTG    Range of Motion Goal PT LTG, Date Established 08/15/17  -MS      Range of Motion Goal PT LTG, Time to Achieve 3 days  -MS      Range fo Motion Goal PT LTG, Joint L knee  -MS      Range of Motion Goal PT LTG, AROM Measure (5, 85)  -MS        User Key  (r) = Recorded By, (t) = Taken By, (c) = Cosigned By    Initials Name Provider Type    MS Sanjay Duffy, PT Physical Therapist          Physical Therapy Education     Title: PT  OT SLP Therapies (Done)     Topic: Physical Therapy (Done)     Point: Mobility training (Done)    Learning Progress Summary    Learner Readiness Method Response Comment Documented by Status   Patient Acceptance E,TB,D VU,NR  RW 08/16/17 1010 Done    Acceptance E,D NR,VU  MS 08/15/17 1003 Done               Point: Home exercise program (Done)    Learning Progress Summary    Learner Readiness Method Response Comment Documented by Status   Patient Acceptance E,TB,D VU,NR  RW 08/16/17 1010 Done    Acceptance E,D NR,VU  MS 08/15/17 1003 Done               Point: Body mechanics (Done)    Learning Progress Summary    Learner Readiness Method Response Comment Documented by Status   Patient Acceptance E,TB,D VU,NR   08/16/17 1010 Done    Acceptance E,D NR,VU  MS 08/15/17 1003 Done               Point: Precautions (Done)    Learning Progress Summary    Learner Readiness Method Response Comment Documented by Status   Patient Acceptance E,TB,D VU,NR  RW 08/16/17 1010 Done    Acceptance E,D NR,VU  MS 08/15/17 1003 Done                      User Key     Initials Effective Dates Name Provider Type Discipline    MS 12/01/15 -  Sanjay Duffy, PT Physical Therapist PT     04/06/16 -  Shelli Rhodes, PTA Physical Therapy Assistant PT                    PT Recommendation and Plan  Anticipated Equipment Needs At Discharge:  (Pt. already owns a RWX for home use.)  Anticipated Discharge Disposition: home with assist, home with home health  Planned Therapy Interventions: balance training, bed mobility training, gait training, home exercise program, patient/family education, postural re-education, ROM (Range of Motion), strengthening, transfer training  PT Frequency: 2 times/day  Plan of Care Review  Plan Of Care Reviewed With: patient  Outcome Summary/Follow up Plan: Pt required less assist w/ all activity today. Plans for d/c home and has no questions for PT at this time.           Outcome Measures       08/17/17 1000 08/16/17  1000 08/15/17 1000    How much help from another person do you currently need...    Turning from your back to your side while in flat bed without using bedrails? 4  -RW 4  -RW 4  -MS    Moving from lying on back to sitting on the side of a flat bed without bedrails? 4  -RW 4  -RW 3  -MS    Moving to and from a bed to a chair (including a wheelchair)? 4  -RW 3  -RW 3  -MS    Standing up from a chair using your arms (e.g., wheelchair, bedside chair)? 4  -RW 3  -RW 3  -MS    Climbing 3-5 steps with a railing? 3  -RW 3  -RW 3  -MS    To walk in hospital room? 4  -RW 3  -RW 3  -MS    AM-PAC 6 Clicks Score 23  -RW 20  -RW 19  -MS    Functional Assessment    Outcome Measure Options AM-PAC 6 Clicks Basic Mobility (PT)  -RW AM-PAC 6 Clicks Basic Mobility (PT)  -RW AM-PAC 6 Clicks Basic Mobility (PT)  -MS      User Key  (r) = Recorded By, (t) = Taken By, (c) = Cosigned By    Initials Name Provider Type    MS Sanjay PERRIN Clive, PT Physical Therapist    RW Shelli Rhodes PTA Physical Therapy Assistant           Time Calculation:         PT Charges       08/17/17 1022          Time Calculation    Start Time 0933  -RW      Stop Time 1005  -RW      Time Calculation (min) 32 min  -RW      PT Received On 08/17/17  -RW        User Key  (r) = Recorded By, (t) = Taken By, (c) = Cosigned By    Initials Name Provider Type     Shelli Rhodes PTA Physical Therapy Assistant          Therapy Charges for Today     Code Description Service Date Service Provider Modifiers Qty    83919371948 HC PT THER PROC EA 15 MIN 8/16/2017 Shelli Rhodes PTA GP 1    46275779756 HC PT THER PROC GROUP 8/16/2017 Shelli Rhodes PTA GP 1    15558981558 HC PT THER PROC EA 15 MIN 8/16/2017 Shelli Rhodes PTA GP 1    28345590274 HC PT THER PROC GROUP 8/16/2017 Shelli Rhodes PTA GP 1    24577685717 HC PT THER PROC EA 15 MIN 8/17/2017 Shelli Rhodes PTA GP 1    38025818660 HC PT THER PROC GROUP 8/17/2017 Shelli Rhodes, PTA GP 1          PT  G-Codes  Outcome Measure Options: AM-PAC 6 Clicks Basic Mobility (PT)    Shelli Rhodes, PTA  8/17/2017

## 2017-08-18 NOTE — PLAN OF CARE
Problem: Patient Care Overview (Adult)  Goal: Plan of Care Review  Outcome: Outcome(s) achieved Date Met:  08/17/17  Goal: Adult Individualization and Mutuality  Outcome: Outcome(s) achieved Date Met:  08/17/17  Goal: Discharge Needs Assessment  Outcome: Outcome(s) achieved Date Met:  08/17/17    Problem: Fall Risk (Adult)  Goal: Absence of Falls  Outcome: Outcome(s) achieved Date Met:  08/17/17

## 2017-08-28 ENCOUNTER — TRANSCRIBE ORDERS (OUTPATIENT)
Dept: PHYSICAL THERAPY | Facility: HOSPITAL | Age: 72
End: 2017-08-28

## 2017-08-28 DIAGNOSIS — Z96.652 S/P REVISION OF TOTAL KNEE, LEFT: Primary | ICD-10-CM

## 2017-08-29 ENCOUNTER — HOSPITAL ENCOUNTER (OUTPATIENT)
Dept: PHYSICAL THERAPY | Facility: HOSPITAL | Age: 72
Setting detail: THERAPIES SERIES
Discharge: HOME | End: 2017-08-29

## 2017-08-29 DIAGNOSIS — Z96.652 STATUS POST TOTAL LEFT KNEE REPLACEMENT: ICD-10-CM

## 2017-08-29 DIAGNOSIS — M25.662 KNEE STIFFNESS, LEFT: ICD-10-CM

## 2017-08-29 DIAGNOSIS — M25.562 ACUTE PAIN OF LEFT KNEE: Primary | ICD-10-CM

## 2017-08-29 PROCEDURE — 97161 PT EVAL LOW COMPLEX 20 MIN: CPT | Performed by: PHYSICAL THERAPIST

## 2017-08-29 PROCEDURE — G8979 MOBILITY GOAL STATUS: HCPCS | Performed by: PHYSICAL THERAPIST

## 2017-08-29 PROCEDURE — 97110 THERAPEUTIC EXERCISES: CPT | Performed by: PHYSICAL THERAPIST

## 2017-08-29 PROCEDURE — 97140 MANUAL THERAPY 1/> REGIONS: CPT | Performed by: PHYSICAL THERAPIST

## 2017-08-29 PROCEDURE — G8980 MOBILITY D/C STATUS: HCPCS | Performed by: PHYSICAL THERAPIST

## 2017-08-29 NOTE — THERAPY EVALUATION
Outpatient Physical Therapy Ortho Initial Evaluation  [facility]     Patient Name: Raheem Clay  : 1945  MRN: [id number]  Today's Date: 2017    Visit Date: 2017    Patient Active Problem List   Diagnosis   • Osteoarthritis, knee      Past Medical History:   Diagnosis Date   • Arthritis    • Back pain    • Coronary artery disease     cabg x 4   • Diabetes mellitus     type 2   • High cholesterol    • Hypertension    • Knee pain, left    • Sleep apnea       Past Surgical History:   Procedure Laterality Date   • APPENDECTOMY     • CARDIAC SURGERY      cabg x 4 2016   • ELBOW ARTHROSCOPY Left     golfers elbow   • FOOT SURGERY Right     trauma   crushed and skin grafts   • JOINT REPLACEMENT      lt knee   • AR REVISE KNEE JOINT REPLACE,1 PART Left 2017    Procedure: LT TOTAL KNEE ARTHROPLASTY REVISION;  Surgeon: Elliott Murphy MD;  Location: [facility] OR;  Service: Orthopedics       Visit Dx:     ICD-10-CM ICD-9-CM   1. Acute pain of left knee M25.562 719.46   2. Knee stiffness, left M25.662 719.56   3. Status post total left knee replacement Z96.652 V43.65           Patient History       17 1400          History    Chief Complaint Pain;Joint swelling;Joint stiffness  -DM      Type of Pain Knee pain   left  -DM      Date Current Problem(s) Began 17  -DM      Brief Description of Current Complaint Revision for failed left TKA from 2014.  Had home health PT for 3 sessions.   -DM      Previous treatment for THIS PROBLEM Rehabilitation;Medication;Surgery  -DM      Surgery Date: 17  -DM      Patient/Caregiver Goals Know what to do to help the symptoms;Return to prior level of function;Relieve pain;Improve mobility;Improve strength;Decrease swelling  -DM      Current Tobacco Use none since   -DM      Patient's Rating of General Health Good  -DM      Occupation/sports/leisure activities Retired ; Part time retail   -DM      Pain     Pain Location Knee    left   -DM      Pain at Present 2  -DM      Pain at Best 1  -DM      Pain at Worst 4  -DM      Pain Frequency Constant/continuous  -DM      Pain Description Aching;Dull;Sore;Sharp;Shooting;Throbbing;Tightness   sharp in shin, joint or ankle occasionally  -DM      What Performance Factors Make the Current Problem(s) WORSE? Speeding up gait, stairs down more than up, rise from sitting.   -DM      What Performance Factors Make the Current Problem(s) BETTER? Ice, meds, change position  -DM      Is your sleep disturbed? Yes  -DM      Is medication used to assist with sleep? No  -DM      Difficulties with ADL's? yes  -DM      Difficulties with recreational activities? yes  -DM      Fall Risk Assessment    Any falls in the past year: No  -DM      Services    Prior Rehab/Home Health Experiences Yes  -DM      When was the prior experience with Rehab/Home Health after hospitalization  -DM      Are you currently receiving Home Health services No  -DM      Daily Activities    Primary Language English  -DM      How does patient learn best? Listening;Reading;Demonstration;Pictures/Video  -DM      Teaching needs identified Home Exercise Program;Management of Condition  -DM      Does patient have problems with the following? None  -DM      Barriers to learning None  -DM      Pt Participated in POC and Goals Yes  -DM      Safety    Are you being hurt, hit, or frightened by anyone at home or in your life? No  -DM      Are you being neglected by a caregiver No  -DM        User Key  (r) = Recorded By, (t) = Taken By, (c) = Cosigned By    Initials Name Provider Type    JAMES Sarmiento, PT Physical Therapist              PT Ortho       08/29/17 1400    Subjective Comments    Subjective Comments Knee is tight and swollen today.  Tried to go with out pain killers and now knee is a bit more sore.   -DM    Subjective Pain    Able to rate subjective pain? yes  -DM    Pre-Treatment Pain Level 3  -DM    Post-Treatment Pain Level 3  -DM     Posture/Observations    Posture/Observations Comments FH, rounded shoulders, slightly stooped forward, left knee in flexed position.   -DM    ROM (Range of Motion)    General ROM Detail left knee  -10 to 105; right knee WNL  -DM    Left Hip    Hip Flexion Gross Movement (3/5) fair  -DM    Hip Extension Gross Movement (3+/5) fair plus  -DM    Hip ABduction Gross Movement (3+/5) fair plus  -DM    Right Hip    Hip Flexion Gross Movement (5/5) normal  -DM    Hip Extension Gross Movement (5/5) normal  -DM    Hip ABduction Gross Movement (5/5) normal  -DM    Left Knee    Knee Extension Gross Movement (3+/5) fair plus  -DM    Knee Flexion Gross Movement (3/5) fair  -DM    Right Knee    Knee Extension Gross Movement (5/5) normal  -DM    Knee Flexion Gross Movement (5/5) normal  -DM    Left Ankle/Foot    Ankle PF Gross Movement (5/5) normal  -DM    Ankle Dorsiflexion Gross Movement (5/5) normal  -DM    Flexibility    Flexibility Tested? Upper Extremity  -DM    RLE Quick Girth (cm)    Tib tuberosity 37 cm  -DM    Mid patella 42.5 cm  -DM    LLE Quick Girth (cm)    Mid patella 45 cm  -DM    Gait Assessment/Treatment    Gait, [place] Level independent  -DM    Gait, Gait Deviations left:;antalgic;hi decreased;decreased heel strike;forward flexed posture;step length decreased;stride length decreased   left knee flexed  -DM      User Key  (r) = Recorded By, (t) = Taken By, (c) = Cosigned By    Initials Name Provider Type    DM Rahel Sarmiento PT Physical Therapist                Therapy Education       08/29/17 5522          Therapy Education    Given HEP  -DM      Program New  -DM      How Provided Verbal;Demonstration;Written  -DM      Provided to Patient  -DM      Level of Understanding Teach back education performed;Verbalized;Demonstrated  -DM        User Key  (r) = Recorded By, (t) = Taken By, (c) = Cosigned By    Initials Name Provider Type    DM Rahel Sarmiento PT Physical Therapist                PT OP Goals        08/29/17 1600       PT Short Term Goals    STG Date to Achieve 09/27/17  -DM     STG 1 Pt will be independent with ROM and intitial strengthening program for left knee.  -DM     STG 1 Progress New  -DM     STG 2 Pt will demonstrate improved left knee ROM to -5 to 110  -DM     STG 2 Progress New  -DM     STG 3 Pt will climb stairs with reciprocal gait using hand raill 100% of time  -DM     STG 3 Progress New  -DM     Long Term Goals    LTG Date to Achieve 10/11/17  -DM     LTG 1 Pt will be independent with LE stabilization/strengthening program for return to his preop functional level.  -DM     LTG 1 Progress New  -DM     LTG 2 Pt will demonstrate improved left knee ROM 0-120 degrees  -DM     LTG 2 Progress New  -DM     LTG 3 Pt will demonstrate decreased knee edema by 2 cm to allow normal mobility of knee with all weight bearing tasks  -DM     LTG 3 Progress New  -DM     LTG 4 Pt will demonstrate improved strength in left knee by 1 grade for stability with all weight bearing tasks  -DM     LTG 4 Progress New  -DM     LTG 5 Pt will report improved function via KOOS from 44% to 20% or less disability  -DM     LTG 5 Progress New  -DM     Time Calculation    PT Goal Re-Cert Due Date 09/27/17  -DM       User Key  (r) = Recorded By, (t) = Taken By, (c) = Cosigned By    Initials Name Provider Type    JAMES Sarmiento, PT Physical Therapist              PT Assessment/Plan       08/29/17 1704 08/29/17 1700    PT Assessment    Functional Limitations  Impaired gait;Limitation in home management;Limitations in community activities;Limitations in functional capacity and performance;Performance in self-care ADL;Performance in leisure activities  -DM    Impairments  Gait;Edema;Pain;Muscle strength;Range of motion;Posture  -DM    Assessment Comments Raheem Grayson is 71 yo male s/p left TKA revision for failed TKA.  He presents with antalgic gait with flexed knee and stooped posture.  He does not use a cane nor has used one since he  came home from the hospital.  His left knee is moderately edematous, left knee ROM is -10 to 105, strength is decreased in left knee and hip and he has pain at end range of motion.  He will benefit from skilled physical therapy intervention for return to his previous activity level.   -DM     Please refer to paper survey for additional self-reported information  Yes  -DM    Rehab Potential  Good  -DM    Patient/caregiver participated in establishment of treatment plan and goals  Yes  -DM    Patient would benefit from skilled therapy intervention  Yes  -DM    PT Plan    PT Frequency  2x/week;3x/week  -DM    Predicted Duration of Therapy Intervention (days/wks)  4-6 weeks  -DM    Planned CPT's?  PT EVAL LOW COMPLEXITY: 35776;PT THER PROC EA 15 MIN: 24958;PT GAIT TRAINING EA 15 MIN: 36441;PT NEUROMUSC RE-EDUCATION EA 15 MIN: 34302;PT MANUAL THERAPY EA 15 MIN: 08396;PT AQUATIC THERAPY EA 15 MIN: 17359;PT ULTRASOUND EA 15 MIN: 00404;PT HOT OR COLD PACK TREAT MCARE;PT ELECTRICAL STIM UNATTEND:   -DM    Physical Therapy Interventions (Optional Details)  aquatics exercise;gait training;home exercise program;postural re-education;patient/family education;neuromuscular re-education;modalities;manual therapy techniques;ROM (Range of Motion);stair training;strengthening;stretching  -DM    PT Plan Comments  Progress in TKA exercise program, manual techniques for edema  -DM      User Key  (r) = Recorded By, (t) = Taken By, (c) = Cosigned By    Initials Name Provider Type    JAMES Sarmiento, PT Physical Therapist                Exercises       08/29/17 1400          Subjective Comments    Subjective Comments Knee is tight and swollen today.  Tried to go with out pain killers and now knee is a bit more sore.   -DM      Subjective Pain    Able to rate subjective pain? yes  -DM      Pre-Treatment Pain Level 3  -DM      Post-Treatment Pain Level 3  -DM      Exercise 1    Exercise Name 1 Instructed in and performed:  Quad set,  SLR, SAQ, supine hip abd, step ups forward and lateral, all x 10 res.  Ended with Nustep L5 x 12 minutes.   -DM      Cueing 1 Verbal;Tactile;Demo  -DM        User Key  (r) = Recorded By, (t) = Taken By, (c) = Cosigned By    Initials Name Provider Type    JAMES Sarmiento PT Physical Therapist           Manual Rx (last 36 hours)      Manual Treatments       08/29/17 1500          Manual Rx 1    Manual Rx 1 Location left knee  -DM      Manual Rx 1 Type STM to medial and lateral aspect of left knee  -DM      Manual Rx 1 Duration 15  -DM        User Key  (r) = Recorded By, (t) = Taken By, (c) = Cosigned By    Initials Name Provider Type    JAMES Sarmiento PT Physical Therapist                            Outcome Measures       08/29/17 1600          Knee Outcome Score    Knee Outcome Score Comments 45/80 or 44% disability  -DM      Functional Assessment    Outcome Measure Options Knee Outcome Score- ADL  -DM        User Key  (r) = Recorded By, (t) = Taken By, (c) = Cosigned By    Initials Name Provider Type    JAMES Sarmiento PT Physical Therapist        Time Calculation:   Start Time: 1430  Stop Time: 1515  Time Calculation (min): 45 min     Therapy Charges for Today     Code Description Service Date Service Provider Modifiers Qty    36854410983 HC PT MOBILITY PROJECTED 8/29/2017 Rahel Sarmiento, PT GP, CK 1    77287412477 HC PT MOBILITY DISCHARGE 8/29/2017 Rahel Sarmiento, PT GP, CJ 1    66189911501 HC PT EVAL LOW COMPLEXITY 1 8/29/2017 Rahel Sarmiento, PT GP 1    01123976863 HC PT THER PROC EA 15 MIN 8/29/2017 Rahel Sarmiento PT GP 1    98681708233 HC PT MANUAL THERAPY EA 15 MIN 8/29/2017 Rahel Sarmiento, PT GP 1          PT G-Codes  PT Professional Judgement Used?: Yes  Outcome Measure Options: Knee Outcome Score- ADL  Score: 45/80 or 44% disability  Functional Limitation: Mobility: Walking and moving around  Mobility: Walking and Moving Around Goal Status (): At least 40 percent but less than 60 percent impaired,  limited or restricted  Mobility: Walking and Moving Around Discharge Status (): At least 20 percent but less than 40 percent impaired, limited or restricted         Rahel Sarmiento, PT, DPT  8/29/2017

## 2017-08-31 ENCOUNTER — HOSPITAL ENCOUNTER (OUTPATIENT)
Dept: PHYSICAL THERAPY | Facility: HOSPITAL | Age: 72
Setting detail: THERAPIES SERIES
Discharge: HOME | End: 2017-08-31

## 2017-08-31 DIAGNOSIS — M25.562 ACUTE PAIN OF LEFT KNEE: Primary | ICD-10-CM

## 2017-08-31 DIAGNOSIS — M25.662 KNEE STIFFNESS, LEFT: ICD-10-CM

## 2017-08-31 DIAGNOSIS — Z96.652 STATUS POST TOTAL LEFT KNEE REPLACEMENT: ICD-10-CM

## 2017-08-31 PROCEDURE — 97110 THERAPEUTIC EXERCISES: CPT | Performed by: PHYSICAL THERAPIST

## 2017-08-31 PROCEDURE — G0283 ELEC STIM OTHER THAN WOUND: HCPCS | Performed by: PHYSICAL THERAPIST

## 2017-08-31 PROCEDURE — 97140 MANUAL THERAPY 1/> REGIONS: CPT | Performed by: PHYSICAL THERAPIST

## 2017-09-05 ENCOUNTER — HOSPITAL ENCOUNTER (OUTPATIENT)
Dept: PHYSICAL THERAPY | Facility: HOSPITAL | Age: 72
Setting detail: THERAPIES SERIES
Discharge: HOME | End: 2017-09-05

## 2017-09-05 DIAGNOSIS — M25.562 ACUTE PAIN OF LEFT KNEE: Primary | ICD-10-CM

## 2017-09-05 DIAGNOSIS — M25.662 KNEE STIFFNESS, LEFT: ICD-10-CM

## 2017-09-05 DIAGNOSIS — Z96.652 STATUS POST TOTAL LEFT KNEE REPLACEMENT: ICD-10-CM

## 2017-09-05 PROCEDURE — 97140 MANUAL THERAPY 1/> REGIONS: CPT | Performed by: PHYSICAL THERAPIST

## 2017-09-05 PROCEDURE — 97035 APP MDLTY 1+ULTRASOUND EA 15: CPT | Performed by: PHYSICAL THERAPIST

## 2017-09-05 PROCEDURE — 97110 THERAPEUTIC EXERCISES: CPT | Performed by: PHYSICAL THERAPIST

## 2017-09-05 NOTE — THERAPY TREATMENT NOTE
Outpatient Physical Therapy Ortho Treatment Note  [facility]     Patient Name: Raheem Clay  : 1945  MRN: [id number]  Today's Date: 2017      Visit Date: 2017    Visit Dx:    ICD-10-CM ICD-9-CM   1. Acute pain of left knee M25.562 719.46   2. Knee stiffness, left M25.662 719.56   3. Status post total left knee replacement Z96.652 V43.65       Patient Active Problem List   Diagnosis   • Osteoarthritis, knee        Past Medical History:   Diagnosis Date   • Arthritis    • Back pain    • Coronary artery disease     cabg x 4   • Diabetes mellitus     type 2   • High cholesterol    • Hypertension    • Knee pain, left    • Sleep apnea         Past Surgical History:   Procedure Laterality Date   • APPENDECTOMY     • CARDIAC SURGERY      cabg x 4 2016   • ELBOW ARTHROSCOPY Left     golfers elbow   • FOOT SURGERY Right     trauma   crushed and skin grafts   • JOINT REPLACEMENT      lt knee   • [place] REVISE KNEE JOINT REPLACE,1 PART Left 2017    Procedure: LT TOTAL KNEE ARTHROPLASTY REVISION;  Surgeon: Elliott Murphy MD;  Location: [facility];  Service: Orthopedics                             PT Assessment/Plan       17 1349       PT Assessment    Assessment Comments Good response to modalities and manual work.  He was able to ambulate with greater ease and tolerated exercise that previously caused increased pain.   -DM     PT Plan    PT Plan Comments Assess effects of manual work and cont progression in TKA exercise.   -DM       User Key  (r) = Recorded By, (t) = Taken By, (c) = Cosigned By    Initials Name Provider Type    DM Rahel Sarmiento, PT Physical Therapist                Modalities       17 1300          Ultrasound 04604    Location Medial left knee and at insertion of biceps femoris  -DM      Rx Minutes 10  -DM      Duty Cycle 100  -DM      Frequency 3.0 MHz  -DM      Intensity - Wts/cm 1.5  -DM      Phonophoresis Yes  -DM        User Key  (r) = Recorded By, (t) =  Taken By, (c) = Cosigned By    Initials Name Provider Type    DM Rahel Sarmiento, PT Physical Therapist                Exercises       09/05/17 1300          Subjective Comments    Subjective Comments Knee is more stiff and sore this morning.  The knee woke him several times last night and he could not get his knee comfortable.  -DM      Subjective Pain    Able to rate subjective pain? yes  -DM      Pre-Treatment Pain Level 5  -DM      Post-Treatment Pain Level 2  -DM      Exercise 1    Exercise Name 1 Completed exercises as on flow sheet.   -DM      Cueing 1 Verbal;Tactile;Demo  -DM        User Key  (r) = Recorded By, (t) = Taken By, (c) = Cosigned By    Initials Name Provider Type    DM Rahel Sarmiento, PT Physical Therapist                        Manual Rx (last 36 hours)      Manual Treatments       09/05/17 1300          Manual Rx 1    Manual Rx 1 Location left knee  -DM      Manual Rx 1 Type STM to medial aspect of left knee with emphasis on the biceps femoris  -DM      Manual Rx 1 Duration 15  -DM      Manual Rx 2    Manual Rx 2 Location left knee  -DM      Manual Rx 2 Type contract relax for knee ROM gains.   -DM        User Key  (r) = Recorded By, (t) = Taken By, (c) = Cosigned By    Initials Name Provider Type    JAMES Sarmiento, PT Physical Therapist                        Time Calculation:   Start Time: 0945  Stop Time: 1030  Time Calculation (min): 45 min    Therapy Charges for Today     Code Description Service Date Service Provider Modifiers Qty    87717370842 HC PT ULTRASOUND EA 15 MIN 9/5/2017 Rahel Sarmiento, PT GP 1    53484491983 HC PT MANUAL THERAPY EA 15 MIN 9/5/2017 Rahel Sarmiento, PT GP 1    16384522645 HC PT THER PROC EA 15 MIN 9/5/2017 Rahel Sarmiento, PT GP 1                    Rahel Sarmiento, PT  9/5/2017

## 2017-09-07 ENCOUNTER — HOSPITAL ENCOUNTER (OUTPATIENT)
Dept: PHYSICAL THERAPY | Facility: HOSPITAL | Age: 72
Setting detail: THERAPIES SERIES
Discharge: HOME | End: 2017-09-07

## 2017-09-07 DIAGNOSIS — M25.662 KNEE STIFFNESS, LEFT: ICD-10-CM

## 2017-09-07 DIAGNOSIS — Z96.652 STATUS POST TOTAL LEFT KNEE REPLACEMENT: ICD-10-CM

## 2017-09-07 DIAGNOSIS — M25.562 ACUTE PAIN OF LEFT KNEE: Primary | ICD-10-CM

## 2017-09-07 PROCEDURE — 97035 APP MDLTY 1+ULTRASOUND EA 15: CPT | Performed by: PHYSICAL THERAPIST

## 2017-09-07 PROCEDURE — 97110 THERAPEUTIC EXERCISES: CPT | Performed by: PHYSICAL THERAPIST

## 2017-09-07 PROCEDURE — 97140 MANUAL THERAPY 1/> REGIONS: CPT | Performed by: PHYSICAL THERAPIST

## 2017-09-07 NOTE — THERAPY TREATMENT NOTE
Outpatient Physical Therapy Ortho Treatment Note  [facility]     Patient Name: Raheem Clay  : 1945  MRN: [id number]  Today's Date: 2017      Visit Date: 2017    Visit Dx:    ICD-10-CM ICD-9-CM   1. Acute pain of left knee M25.562 719.46   2. Knee stiffness, left M25.662 719.56   3. Status post total left knee replacement Z96.652 V43.65       Patient Active Problem List   Diagnosis   • Osteoarthritis, knee        Past Medical History:   Diagnosis Date   • Arthritis    • Back pain    • Coronary artery disease     cabg x 4   • Diabetes mellitus     type 2   • High cholesterol    • Hypertension    • Knee pain, left    • Sleep apnea         Past Surgical History:   Procedure Laterality Date   • APPENDECTOMY     • CARDIAC SURGERY      cabg x 4 2016   • ELBOW ARTHROSCOPY Left     golfers elbow   • FOOT SURGERY Right     trauma   crushed and skin grafts   • JOINT REPLACEMENT      lt knee   • [place] REVISE KNEE JOINT REPLACE,1 PART Left 2017    Procedure: LT TOTAL KNEE ARTHROPLASTY REVISION;  Surgeon: Elliott Murphy MD;  Location: [facility];  Service: Orthopedics                             PT Assessment/Plan       17 1626       PT Assessment    Assessment Comments No clicking and much less pain after modalities, manual work and taping.    -DM     PT Plan    PT Plan Comments Assess knee clicking and pain and progress in TKA program  -DM       User Key  (r) = Recorded By, (t) = Taken By, (c) = Cosigned By    Initials Name Provider Type    JAMES Sarmiento, PT Physical Therapist                Modalities       17 1500          Subjective Comments    Subjective Comments Having clicking in lateral knee today with going up stairs and prolonged walking.   -DM      Subjective Pain    Able to rate subjective pain? yes  -DM      Pre-Treatment Pain Level 5  -DM      Post-Treatment Pain Level 2  -DM      Ultrasound 11277    Location Lateral left knee at insertion of ITB  -DM      Rx  Minutes 10  -DM      Duty Cycle 100  -DM      Frequency 3.0 MHz  -DM      Intensity - Wts/cm 1.5  -DM      Phonophoresis Yes  -DM        User Key  (r) = Recorded By, (t) = Taken By, (c) = Cosigned By    Initials Name Provider Type    DM Rahel Sarmiento, PT Physical Therapist                Exercises       09/07/17 1500          Subjective Comments    Subjective Comments Having clicking in lateral knee today with going up stairs and prolonged walking.   -DM      Subjective Pain    Able to rate subjective pain? yes  -DM      Pre-Treatment Pain Level 5  -DM      Post-Treatment Pain Level 2  -DM      Exercise 1    Exercise Name 1 Completed exercises as on flow sheet.   -DM      Cueing 1 Verbal;Tactile;Demo  -DM        User Key  (r) = Recorded By, (t) = Taken By, (c) = Cosigned By    Initials Name Provider Type    JAMES Sarmiento, PT Physical Therapist                        Manual Rx (last 36 hours)      Manual Treatments       09/07/17 1500          Manual Rx 1    Manual Rx 1 Location left knee  -DM      Manual Rx 1 Type STM to lateral aspect of left knee with emphasis at ITB insertion  -DM      Manual Rx 1 Duration 15  -DM      Manual Rx 2    Manual Rx 2 Location left knee  -DM      Manual Rx 2 Type Kinesio tape for lateral support and faciliation of ITB  -DM        User Key  (r) = Recorded By, (t) = Taken By, (c) = Cosigned By    Initials Name Provider Type    JAMES Sarmiento, PT Physical Therapist                        Time Calculation:   Start Time: 1515  Stop Time: 1600  Time Calculation (min): 45 min    Therapy Charges for Today     Code Description Service Date Service Provider Modifiers Qty    16503020692 HC PT THER PROC EA 15 MIN 9/7/2017 Rahel Sarmiento, PT GP 1    19434186485 HC PT ULTRASOUND EA 15 MIN 9/7/2017 Rahel Sarmiento, PT GP 1    74352249966 HC PT MANUAL THERAPY EA 15 MIN 9/7/2017 Rahel Sarmiento, PT GP 1                    Rahel Sarmiento, PT  9/7/2017

## 2017-09-12 ENCOUNTER — HOSPITAL ENCOUNTER (OUTPATIENT)
Dept: PHYSICAL THERAPY | Facility: HOSPITAL | Age: 72
Setting detail: THERAPIES SERIES
Discharge: HOME | End: 2017-09-12

## 2017-09-12 DIAGNOSIS — M25.562 ACUTE PAIN OF LEFT KNEE: Primary | ICD-10-CM

## 2017-09-12 DIAGNOSIS — Z96.652 STATUS POST TOTAL LEFT KNEE REPLACEMENT: ICD-10-CM

## 2017-09-12 DIAGNOSIS — M25.662 KNEE STIFFNESS, LEFT: ICD-10-CM

## 2017-09-12 PROCEDURE — 97110 THERAPEUTIC EXERCISES: CPT | Performed by: PHYSICAL THERAPIST

## 2017-09-12 PROCEDURE — 97035 APP MDLTY 1+ULTRASOUND EA 15: CPT | Performed by: PHYSICAL THERAPIST

## 2017-09-12 NOTE — THERAPY TREATMENT NOTE
Outpatient Physical Therapy Ortho Treatment Note  [facility]     Patient Name: Raheem Clay  : 1945  MRN: [id number]  Today's Date: 2017      Visit Date: 2017    Visit Dx:    ICD-10-CM ICD-9-CM   1. Acute pain of left knee M25.562 719.46   2. Knee stiffness, left M25.662 719.56   3. Status post total left knee replacement Z96.652 V43.65       Patient Active Problem List   Diagnosis   • Osteoarthritis, knee        Past Medical History:   Diagnosis Date   • Arthritis    • Back pain    • Coronary artery disease     cabg x 4   • Diabetes mellitus     type 2   • High cholesterol    • Hypertension    • Knee pain, left    • Sleep apnea         Past Surgical History:   Procedure Laterality Date   • APPENDECTOMY     • CARDIAC SURGERY      cabg x 4 2016   • ELBOW ARTHROSCOPY Left     golfers elbow   • FOOT SURGERY Right     trauma   crushed and skin grafts   • JOINT REPLACEMENT      lt knee   • [place] REVISE KNEE JOINT REPLACE,1 PART Left 2017    Procedure: LT TOTAL KNEE ARTHROPLASTY REVISION;  Surgeon: Elliott Murphy MD;  Location: [facility];  Service: Orthopedics                             PT Assessment/Plan       17 1013       PT Assessment    Functional Limitations Decreased safety during functional activities  -DM     Assessment Comments Still with clicking ascending stairs but appears to be primarily related to ITB tightness.  Addign ITB stretch should help decreased clicking  -DM     PT Plan    PT Plan Comments cont to assess clicking and progress in TKA program  -DM       User Key  (r) = Recorded By, (t) = Taken By, (c) = Cosigned By    Initials Name Provider Type    DM Rahel Sarmiento, PT Physical Therapist                Modalities       17 1000          Ultrasound 71214    Location Lateral left knee at insertion of ITB  -DM      Duty Cycle 100  -DM      Frequency 3.0 MHz  -DM      Intensity - Wts/cm 1.5  -DM      Phonophoresis Yes  -DM        User Key  (r) =  Recorded By, (t) = Taken By, (c) = Cosigned By    Initials Name Provider Type    JAMES Sarmiento, JOSE Physical Therapist                Exercises       09/12/17 1000          Subjective Comments    Subjective Comments the taping lasted about 2 hours and did not help.  Had increased pain on Saturday due to Grandparents day at [employer] and walked a great deal  Still with clicking going up stairs and occasionally with walking.   -DM      Subjective Pain    Able to rate subjective pain? yes  -DM      Pre-Treatment Pain Level 5  -DM      Post-Treatment Pain Level 3  -DM      Exercise 1    Exercise Name 1 Completed exercises as on flow sheet.   -DM      Cueing 1 Verbal;Tactile;Demo  -DM      Exercise 2    Exercise Name 2 Progressed on [employer] equipment  -DM      Cueing 2 Verbal;Tactile;Demo  -DM      Exercise 3    Exercise Name 3 Instructed in sidelying ITB stretch and standing hamstring stretch on step.  -DM      Cueing 3 Verbal;Tactile;Demo  -DM        User Key  (r) = Recorded By, (t) = Taken By, (c) = Cosigned By    Initials Name Provider Type    JAMES Sarmiento PT Physical Therapist                        Manual Rx (last 36 hours)      Manual Treatments       09/12/17 0900          Manual Rx 1    Manual Rx 1 Location left knee  -DM      Manual Rx 1 Type STM to lateral aspect of left knee with emphasis at ITB insertion  -DM      Manual Rx 1 Duration 5  -DM        User Key  (r) = Recorded By, (t) = Taken By, (c) = Cosigned By    Initials Name Provider Type    JAMES Sarmiento PT Physical Therapist                PT OP Goals       09/12/17 1000       PT Short Term Goals    STG 1 Pt will be independent with ROM and intitial strengthening program for left knee.  -DM     STG 1 Progress Met  -DM     STG 2 Pt will demonstrate improved left knee ROM to -5 to 110  -DM     STG 2 Progress Progressing  -DM     STG 2 Progress Comments -5 to 108  -DM     STG 3 Pt will climb stairs with reciprocal gait using hand raill 100% of time   -DM     STG 3 Progress Ongoing  -DM     Long Term Goals    LTG 1 Pt will be independent with LE stabilization/strengthening program for return to his preop functional level.  -DM     LTG 1 Progress Ongoing  -DM     LTG 2 Pt will demonstrate improved left knee ROM 0-120 degrees  -DM     LTG 2 Progress Ongoing  -DM     LTG 3 Pt will demonstrate decreased knee edema by 2 cm to allow normal mobility of knee with all weight bearing tasks  -DM     LTG 3 Progress Ongoing  -DM     LTG 4 Pt will demonstrate improved strength in left knee by 1 grade for stability with all weight bearing tasks  -DM     LTG 4 Progress Ongoing  -DM     LTG 5 Pt will report improved function via KOOS from 44% to 20% or less disability  -DM     LTG 5 Progress Ongoing  -DM       User Key  (r) = Recorded By, (t) = Taken By, (c) = Cosigned By    Initials Name Provider Type    JAMES Sarmiento, PT Physical Therapist                    Time Calculation:   Start Time: 0815  Stop Time: 0900  Time Calculation (min): 45 min    Therapy Charges for Today     Code Description Service Date Service Provider Modifiers Qty    15492884875 HC PT THER PROC EA 15 MIN 9/12/2017 Rahel Sarmiento, PT GP 2    34093329943 HC PT ULTRASOUND EA 15 MIN 9/12/2017 Rahel Sarmiento, PT GP 1                    Rahel Sarmiento PT  9/12/2017

## 2017-09-14 ENCOUNTER — HOSPITAL ENCOUNTER (OUTPATIENT)
Dept: PHYSICAL THERAPY | Facility: HOSPITAL | Age: 72
Setting detail: THERAPIES SERIES
Discharge: HOME | End: 2017-09-14

## 2017-09-14 DIAGNOSIS — Z96.652 STATUS POST TOTAL LEFT KNEE REPLACEMENT: ICD-10-CM

## 2017-09-14 DIAGNOSIS — M25.662 KNEE STIFFNESS, LEFT: ICD-10-CM

## 2017-09-14 DIAGNOSIS — M25.562 ACUTE PAIN OF LEFT KNEE: Primary | ICD-10-CM

## 2017-09-14 PROCEDURE — G0283 ELEC STIM OTHER THAN WOUND: HCPCS | Performed by: PHYSICAL THERAPIST

## 2017-09-14 PROCEDURE — 97110 THERAPEUTIC EXERCISES: CPT | Performed by: PHYSICAL THERAPIST

## 2017-09-14 NOTE — THERAPY TREATMENT NOTE
Outpatient Physical Therapy Ortho Treatment Note  [facility]     Patient Name: Raheem Clay  : 1945  MRN: [id number]  Today's Date: 2017      Visit Date: 2017    Visit Dx:    ICD-10-CM ICD-9-CM   1. Acute pain of left knee M25.562 719.46   2. Knee stiffness, left M25.662 719.56   3. Status post total left knee replacement Z96.652 V43.65       Patient Active Problem List   Diagnosis   • Osteoarthritis, knee        Past Medical History:   Diagnosis Date   • Arthritis    • Back pain    • Coronary artery disease     cabg x 4   • Diabetes mellitus     type 2   • High cholesterol    • Hypertension    • Knee pain, left    • Sleep apnea         Past Surgical History:   Procedure Laterality Date   • APPENDECTOMY     • CARDIAC SURGERY      cabg x 4 2016   • ELBOW ARTHROSCOPY Left     golfers elbow   • FOOT SURGERY Right     trauma   crushed and skin grafts   • JOINT REPLACEMENT      lt knee   • [place] REVISE KNEE JOINT REPLACE,1 PART Left 2017    Procedure: LT TOTAL KNEE ARTHROPLASTY REVISION;  Surgeon: Elliott Murphy MD;  Location: [facility];  Service: Orthopedics             PT Ortho       17 1500    LLE Quick Girth (cm)    Tib tuberosity 41.5 cm  -DM    Mid patella 45 cm  -DM      User Key  (r) = Recorded By, (t) = Taken By, (c) = Cosigned By    Initials Name Provider Type    JAMES Sarmiento, PT Physical Therapist                            PT Assessment/Plan       17 1535       PT Plan    Planned CPT's? PT EVAL LOW COMPLEXITY: 64860  -DM     PT Plan Comments Cont with strengthening of knee in TKA program  -DM       User Key  (r) = Recorded By, (t) = Taken By, (c) = Cosigned By    Initials Name Provider Type    JAMES Sarmiento, PT Physical Therapist                Modalities       17 1500          Ice    Ice Applied Yes  -DM      Location left knee  -DM      Rx Minutes 15 mins  -DM      ELECTRICAL STIMULATION    Attended/Unattended Unattended  -DM      Stimulation  Type IFC  -DM      Max mAmp 25  -DM      Location/Electrode Placement/Other crossed pattern on left knee  -DM      Rx Minutes 15 mins  -DM        User Key  (r) = Recorded By, (t) = Taken By, (c) = Cosigned By    Initials Name Provider Type    JAMES Sarmiento, PT Physical Therapist                Exercises       09/14/17 1500          Subjective Comments    Subjective Comments Woke up after rolling over and had sharp pain in knee.  Took Tramadol.  This AM the knee was swollen and painful.  Took Percocet and ice the knee.  He is concerned now that the knee is clunking.   -DM      Subjective Pain    Able to rate subjective pain? yes  -DM      Pre-Treatment Pain Level 4  -DM      Post-Treatment Pain Level 3  -DM      Exercise 1    Exercise Name 1 Completed exercises as on flow sheet.   -DM      Cueing 1 Verbal;Tactile;Demo  -DM      Exercise 2    Exercise Name 2 Assessed stability of the knee.  It is stable in all directions but did not perform a Lachman.    -DM        User Key  (r) = Recorded By, (t) = Taken By, (c) = Cosigned By    Initials Name Provider Type    JAMES Sarmiento, PT Physical Therapist                                       Time Calculation:   Start Time: 1440  Stop Time: 1530  Time Calculation (min): 50 min    Therapy Charges for Today     Code Description Service Date Service Provider Modifiers Qty    71688940060 HC PT THER PROC EA 15 MIN 9/14/2017 Rahel Sarmiento, PT GP 2    85950408825 HC PT HOT OR COLD PACK TREAT MCARE 9/14/2017 Rahel Sarmiento, PT GP 1    52350246840 HC PT ELECTRICAL STIM UNATTENDED 9/14/2017 Rahel Sarmiento, PT  1                    Rahel Sarmiento, PT  9/14/2017

## 2017-09-15 DIAGNOSIS — Z96.652 STATUS POST TOTAL LEFT KNEE REPLACEMENT: ICD-10-CM

## 2017-09-15 DIAGNOSIS — M25.562 ACUTE PAIN OF LEFT KNEE: ICD-10-CM

## 2017-09-15 DIAGNOSIS — M25.662 KNEE STIFFNESS, LEFT: ICD-10-CM

## 2017-09-19 ENCOUNTER — HOSPITAL ENCOUNTER (OUTPATIENT)
Dept: PHYSICAL THERAPY | Facility: HOSPITAL | Age: 72
Setting detail: THERAPIES SERIES
Discharge: HOME | End: 2017-09-19

## 2017-09-19 DIAGNOSIS — M25.662 KNEE STIFFNESS, LEFT: ICD-10-CM

## 2017-09-19 DIAGNOSIS — M25.562 ACUTE PAIN OF LEFT KNEE: Primary | ICD-10-CM

## 2017-09-19 DIAGNOSIS — Z96.652 STATUS POST TOTAL LEFT KNEE REPLACEMENT: ICD-10-CM

## 2017-09-19 PROCEDURE — 97140 MANUAL THERAPY 1/> REGIONS: CPT | Performed by: PHYSICAL THERAPIST

## 2017-09-19 PROCEDURE — 97110 THERAPEUTIC EXERCISES: CPT | Performed by: PHYSICAL THERAPIST

## 2017-09-19 NOTE — THERAPY TREATMENT NOTE
Outpatient Physical Therapy Ortho Treatment Note  [facility]     Patient Name: Raheem Clay  : 1945  MRN: [id number]  Today's Date: 2017      Visit Date: 2017    Visit Dx:    ICD-10-CM ICD-9-CM   1. Acute pain of left knee M25.562 719.46   2. Knee stiffness, left M25.662 719.56   3. Status post total left knee replacement Z96.652 V43.65       Patient Active Problem List   Diagnosis   • Osteoarthritis, knee        Past Medical History:   Diagnosis Date   • Arthritis    • Back pain    • Coronary artery disease     cabg x 4   • Diabetes mellitus     type 2   • High cholesterol    • Hypertension    • Knee pain, left    • Sleep apnea         Past Surgical History:   Procedure Laterality Date   • APPENDECTOMY     • CARDIAC SURGERY      cabg x 4 2016   • ELBOW ARTHROSCOPY Left     golfers elbow   • FOOT SURGERY Right     trauma   crushed and skin grafts   • JOINT REPLACEMENT      lt knee   • [place] REVISE KNEE JOINT REPLACE,1 PART Left 2017    Procedure: LT TOTAL KNEE ARTHROPLASTY REVISION;  Surgeon: Elliott Murphy MD;  Location: [facility];  Service: Orthopedics                             PT Assessment/Plan       17 1106       PT Assessment    Assessment Comments Less clicking and pain in the knee.  The knee is feeling stronger to him and he is walking better.   -DM     PT Plan    PT Plan Comments cont with strengthening and stabilization s/p TKA  -DM       User Key  (r) = Recorded By, (t) = Taken By, (c) = Cosigned By    Initials Name Provider Type    JAMES Sarmiento, PT Physical Therapist                    Exercises       17 1100          Subjective Comments    Subjective Comments Had the bet day ever yesterday.  The knee is feeling more stable and not having any more sharp/stabbing pain.   -DM      Subjective Pain    Able to rate subjective pain? yes  -DM      Pre-Treatment Pain Level 3  -DM      Post-Treatment Pain Level 1  -DM      Exercise 1    Exercise Name 1  Completed exercises as on flow sheet.   -DM      Cueing 1 Verbal;Tactile;Demo  -DM        User Key  (r) = Recorded By, (t) = Taken By, (c) = Cosigned By    Initials Name Provider Type    JAMES Sarmiento, PT Physical Therapist                        Manual Rx (last 36 hours)      Manual Treatments       09/19/17 1100          Manual Rx 1    Manual Rx 1 Location left knee  -DM      Manual Rx 1 Type STM to lateral aspect of left knee with emphasis at ITB insertion  -DM      Manual Rx 1 Duration 10  -DM      Manual Rx 2    Manual Rx 2 Location Left knee  -DM      Manual Rx 2 Type passive stretching into flexion and extension   -DM      Manual Rx 2 Duration 10  -DM        User Key  (r) = Recorded By, (t) = Taken By, (c) = Cosigned By    Initials Name Provider Type    JAMES Sarmiento PT Physical Therapist                        Time Calculation:   Start Time: 0945  Stop Time: 1030  Time Calculation (min): 45 min    Therapy Charges for Today     Code Description Service Date Service Provider Modifiers Qty    38844380587 HC PT THER PROC EA 15 MIN 9/19/2017 Rahel Sarmiento, PT GP 2    02869780527 HC PT MANUAL THERAPY EA 15 MIN 9/19/2017 Rahel Sarmiento, PT GP 1                    Rahel Sarmiento, PT  9/19/2017

## 2017-09-22 ENCOUNTER — HOSPITAL ENCOUNTER (OUTPATIENT)
Dept: PHYSICAL THERAPY | Facility: HOSPITAL | Age: 72
Setting detail: THERAPIES SERIES
Discharge: HOME | End: 2017-09-22

## 2017-09-22 DIAGNOSIS — M25.562 ACUTE PAIN OF LEFT KNEE: Primary | ICD-10-CM

## 2017-09-22 DIAGNOSIS — Z96.652 STATUS POST TOTAL LEFT KNEE REPLACEMENT: ICD-10-CM

## 2017-09-22 DIAGNOSIS — M25.662 KNEE STIFFNESS, LEFT: ICD-10-CM

## 2017-09-22 PROCEDURE — 97110 THERAPEUTIC EXERCISES: CPT | Performed by: PHYSICAL THERAPIST

## 2017-09-22 PROCEDURE — 97140 MANUAL THERAPY 1/> REGIONS: CPT | Performed by: PHYSICAL THERAPIST

## 2017-09-22 NOTE — THERAPY TREATMENT NOTE
Outpatient Physical Therapy Ortho Treatment Note  [facility]     Patient Name: Raheem Clay  : 1945  MRN: [id number]  Today's Date: 2017      Visit Date: 2017    Visit Dx:    ICD-10-CM ICD-9-CM   1. Acute pain of left knee M25.562 719.46   2. Knee stiffness, left M25.662 719.56   3. Status post total left knee replacement Z96.652 V43.65       Patient Active Problem List   Diagnosis   • Osteoarthritis, knee        Past Medical History:   Diagnosis Date   • Arthritis    • Back pain    • Coronary artery disease     cabg x 4   • Diabetes mellitus     type 2   • High cholesterol    • Hypertension    • Knee pain, left    • Sleep apnea         Past Surgical History:   Procedure Laterality Date   • APPENDECTOMY     • CARDIAC SURGERY      cabg x 4 2016   • ELBOW ARTHROSCOPY Left     golfers elbow   • FOOT SURGERY Right     trauma   crushed and skin grafts   • JOINT REPLACEMENT      lt knee   • [place] REVISE KNEE JOINT REPLACE,1 PART Left 2017    Procedure: LT TOTAL KNEE ARTHROPLASTY REVISION;  Surgeon: Elliott Murphy MD;  Location: [facility];  Service: Orthopedics             PT Ortho       17 0900    ROM (Range of Motion)    General ROM Detail -5 to 125  -DM      User Key  (r) = Recorded By, (t) = Taken By, (c) = Cosigned By    Initials Name Provider Type    JAMES Sarmiento, PT Physical Therapist                            PT Assessment/Plan       17 1041       PT Assessment    Assessment Comments Continuing to gain strength and stability in knee.  ROM improved in flexion to 125.  Incision is becoming a bit more adherent in the distal mid section.   -DM     PT Plan    PT Plan Comments Cont with strengthening and work on knee extension mobiliization along with scar massage.   -DM       User Key  (r) = Recorded By, (t) = Taken By, (c) = Cosigned By    Initials Name Provider Type    JAMES Sarmiento, PT Physical Therapist                    Exercises       17 1000  09/22/17 0900       Subjective Comments    Subjective Comments --  -DM Knee is continuing to feel better. Quique recumbent bike made his hips uncomfortable but the knee did just fine.  -DM     Subjective Pain    Able to rate subjective pain? --  -DM yes  -DM     Pre-Treatment Pain Level --  -DM 3  -DM     Post-Treatment Pain Level --  -DM 1  -DM     Exercise 1    Exercise Name 1  Began on Recumbent bike. Completed exercises as on flow sheet.   -DM       User Key  (r) = Recorded By, (t) = Taken By, (c) = Cosigned By    Initials Name Provider Type    DM Rahel Sarmiento PT Physical Therapist                        Manual Rx (last 36 hours)      Manual Treatments       09/22/17 0900          Manual Rx 1    Manual Rx 1 Location left knee  -DM      Manual Rx 1 Duration 10  -DM      Manual Rx 2    Manual Rx 2 Location Left knee  -DM      Manual Rx 2 Type passive stretching into flexion and extension   -DM      Manual Rx 2 Duration 10  -DM        User Key  (r) = Recorded By, (t) = Taken By, (c) = Cosigned By    Initials Name Provider Type    JAMES Sarmiento PT Physical Therapist                        Time Calculation:   Start Time: 0945  Stop Time: 1030  Time Calculation (min): 45 min    Therapy Charges for Today     Code Description Service Date Service Provider Modifiers Qty    21650299847 HC PT THER PROC EA 15 MIN 9/22/2017 Rahel Sarmiento, PT GP 2    43228212748 HC PT MANUAL THERAPY EA 15 MIN 9/22/2017 Rahel Sarmiento PT GP 1                    Rahel Sarmiento PT  9/22/2017

## 2017-09-26 ENCOUNTER — HOSPITAL ENCOUNTER (OUTPATIENT)
Dept: PHYSICAL THERAPY | Facility: HOSPITAL | Age: 72
Setting detail: THERAPIES SERIES
Discharge: HOME | End: 2017-09-26

## 2017-09-26 DIAGNOSIS — M25.562 ACUTE PAIN OF LEFT KNEE: Primary | ICD-10-CM

## 2017-09-26 DIAGNOSIS — Z96.652 STATUS POST TOTAL LEFT KNEE REPLACEMENT: ICD-10-CM

## 2017-09-26 DIAGNOSIS — M25.662 KNEE STIFFNESS, LEFT: ICD-10-CM

## 2017-09-26 PROCEDURE — 97110 THERAPEUTIC EXERCISES: CPT | Performed by: PHYSICAL THERAPIST

## 2017-09-26 PROCEDURE — G0283 ELEC STIM OTHER THAN WOUND: HCPCS | Performed by: PHYSICAL THERAPIST

## 2017-09-26 NOTE — THERAPY TREATMENT NOTE
Outpatient Physical Therapy Ortho Treatment Note  [facility]     Patient Name: Raheem Clay  : 1945  MRN: [id number]  Today's Date: 2017      Visit Date: 2017    Visit Dx:    ICD-10-CM ICD-9-CM   1. Acute pain of left knee M25.562 719.46   2. Knee stiffness, left M25.662 719.56   3. Status post total left knee replacement Z96.652 V43.65       Patient Active Problem List   Diagnosis   • Osteoarthritis, knee        Past Medical History:   Diagnosis Date   • Arthritis    • Back pain    • Coronary artery disease     cabg x 4   • Diabetes mellitus     type 2   • High cholesterol    • Hypertension    • Knee pain, left    • Sleep apnea         Past Surgical History:   Procedure Laterality Date   • APPENDECTOMY     • CARDIAC SURGERY      cabg x 4 2016   • ELBOW ARTHROSCOPY Left     golfers elbow   • FOOT SURGERY Right     trauma   crushed and skin grafts   • JOINT REPLACEMENT      lt knee   • [place] REVISE KNEE JOINT REPLACE,1 PART Left 2017    Procedure: LT TOTAL KNEE ARTHROPLASTY REVISION;  Surgeon: Elliott Murphy MD;  Location: [facility];  Service: Orthopedics             PT Ortho       17 1600    LLE Quick Girth (cm)    Mid patella 45 cm  -DM      User Key  (r) = Recorded By, (t) = Taken By, (c) = Cosigned By    Initials Name Provider Type    JAMES Sarmiento, PT Physical Therapist                            PT Assessment/Plan       17 1620       PT Assessment    Assessment Comments Pt knee did not tolerate the increased activity level yesterday and his knee became swollen and brace was too tight.  All contributed to increased pain.  Much better after treatment.  -DM     PT Plan    PT Plan Comments Cont with strengthening.  If less pain work on scar massage and extension mobilization.   -DM       User Key  (r) = Recorded By, (t) = Taken By, (c) = Cosigned By    Initials Name Provider Type    JAMES Sarmiento, JOSE Physical Therapist                Modalities        09/26/17 1600          Ice    Ice Applied Yes  -DM      Location left knee  -DM      Rx Minutes 15 mins  -DM      ELECTRICAL STIMULATION    Attended/Unattended Unattended  -DM      Stimulation Type IFC  -DM      Max mAmp 25  -DM      Location/Electrode Placement/Other crossed pattern on posterior left knee  -DM      Rx Minutes 15 mins  -DM        User Key  (r) = Recorded By, (t) = Taken By, (c) = Cosigned By    Initials Name Provider Type    JAMES Sarmiento, PT Physical Therapist                Exercises       09/26/17 1600          Subjective Comments    Subjective Comments Went to his grandsons soccer match and had to go up and down some hills with a lot of walking.  Knee swelled up and bdcame painful.  Had to take a percocet.  Bettter today but still achy  -DM      Subjective Pain    Able to rate subjective pain? yes  -DM      Pre-Treatment Pain Level 4  -DM      Post-Treatment Pain Level 2  -DM      Exercise 1    Exercise Name 1 Began on Recumbent bike. Completed exercises as on flow sheet.   -DM      Additional Comments Noticed bruise and red markings at top of knee - from knee brace that got too tight yesterday.   -DM      Exercise 2    Exercise Name 2 Switched to pulleys for hip ext, hip abd and RTE.  -DM        User Key  (r) = Recorded By, (t) = Taken By, (c) = Cosigned By    Initials Name Provider Type    JAMES Sarmiento PT Physical Therapist                                       Time Calculation:   Start Time: 1515  Stop Time: 1600  Time Calculation (min): 45 min    Therapy Charges for Today     Code Description Service Date Service Provider Modifiers Qty    37492818622 HC PT THER PROC EA 15 MIN 9/26/2017 Rahel Sarmiento, PT GP 2    14744667444 HC PT HOT OR COLD PACK TREAT MCARE 9/26/2017 Rahel Sarmiento, PT GP 1    86645896050 HC PT ELECTRICAL STIM UNATTENDED 9/26/2017 Rahel Sarmiento, PT  1                    Rahel Sarmiento, PT  9/26/2017

## 2017-09-28 ENCOUNTER — HOSPITAL ENCOUNTER (OUTPATIENT)
Dept: PHYSICAL THERAPY | Facility: HOSPITAL | Age: 72
Setting detail: THERAPIES SERIES
Discharge: HOME | End: 2017-09-28

## 2017-09-28 DIAGNOSIS — M25.562 ACUTE PAIN OF LEFT KNEE: Primary | ICD-10-CM

## 2017-09-28 DIAGNOSIS — M25.662 KNEE STIFFNESS, LEFT: ICD-10-CM

## 2017-09-28 DIAGNOSIS — Z96.652 STATUS POST TOTAL LEFT KNEE REPLACEMENT: ICD-10-CM

## 2017-09-28 PROCEDURE — G0283 ELEC STIM OTHER THAN WOUND: HCPCS | Performed by: PHYSICAL THERAPIST

## 2017-09-28 PROCEDURE — G8980 MOBILITY D/C STATUS: HCPCS | Performed by: PHYSICAL THERAPIST

## 2017-09-28 PROCEDURE — 97110 THERAPEUTIC EXERCISES: CPT | Performed by: PHYSICAL THERAPIST

## 2017-09-28 PROCEDURE — G8979 MOBILITY GOAL STATUS: HCPCS | Performed by: PHYSICAL THERAPIST

## 2017-09-28 NOTE — THERAPY TREATMENT NOTE
Outpatient Physical Therapy Ortho Treatment Note  [facility]     Patient Name: Raheem Clay  : 1945  MRN: [id number]  Today's Date: 2017      Visit Date: 2017    Visit Dx:    ICD-10-CM ICD-9-CM   1. Acute pain of left knee M25.562 719.46   2. Knee stiffness, left M25.662 719.56   3. Status post total left knee replacement Z96.652 V43.65       Patient Active Problem List   Diagnosis   • Osteoarthritis, knee        Past Medical History:   Diagnosis Date   • Arthritis    • Back pain    • Coronary artery disease     cabg x 4   • Diabetes mellitus     type 2   • High cholesterol    • Hypertension    • Knee pain, left    • Sleep apnea         Past Surgical History:   Procedure Laterality Date   • APPENDECTOMY     • CARDIAC SURGERY      cabg x 4 2016   • ELBOW ARTHROSCOPY Left     golfers elbow   • FOOT SURGERY Right     trauma   crushed and skin grafts   • JOINT REPLACEMENT      lt knee   • [place] REVISE KNEE JOINT REPLACE,1 PART Left 2017    Procedure: LT TOTAL KNEE ARTHROPLASTY REVISION;  Surgeon: Elliott Murphy MD;  Location: [facility];  Service: Orthopedics             PT Ortho       17 0800    LLE Quick Girth (cm)    Mid patella 44 cm  -DM      17 1600    LLE Quick Girth (cm)    Mid patella 45 cm  -DM      User Key  (r) = Recorded By, (t) = Taken By, (c) = Cosigned By    Initials Name Provider Type    JAMES Sarmiento, PT Physical Therapist                            PT Assessment/Plan       17 0953       PT Assessment    Assessment Comments Overall his knee is doing well except for extension.  His extension is still lacking and he is painful with any mobilization or stretching of knee.    -DM     PT Plan    PT Plan Comments cont with strengthenign and focus on knee extension.   Pt to do knee extension positioning at home  -DM       User Key  (r) = Recorded By, (t) = Taken By, (c) = Cosigned By    Initials Name Provider Type    JAMES Sarmiento, JOSE Physical  Therapist                Modalities       09/28/17 0800          Ice    Ice Applied Yes  -DM      Location left knee  -DM      Rx Minutes 15 mins  -DM      ELECTRICAL STIMULATION    Attended/Unattended Unattended  -DM      Stimulation Type IFC  -DM      Max mAmp 25  -DM      Location/Electrode Placement/Other crossed pattern on posterior left knee  -DM      Rx Minutes 15 mins  -DM        User Key  (r) = Recorded By, (t) = Taken By, (c) = Cosigned By    Initials Name Provider Type    JAMES Sarmiento, PT Physical Therapist                Exercises       09/28/17 0800          Subjective Comments    Subjective Comments The Treatment on the knee helped.  Started having bee sting like pain in the lateral ankle last night.    -DM      Subjective Pain    Able to rate subjective pain? yes  -DM      Pre-Treatment Pain Level 3  -DM      Post-Treatment Pain Level 2  -DM      Exercise 1    Exercise Name 1 Began on Recumbent bike. Completed exercises as on flow sheet.   -DM      Cueing 1 Verbal;Tactile;Demo  -DM      Exercise 2    Exercise Name 2 Positioned with towel roll under heel for knee extension gains x 5 minutes and intermittently during Ice and Estim  -DM        User Key  (r) = Recorded By, (t) = Taken By, (c) = Cosigned By    Initials Name Provider Type    JAMES Sarmiento, PT Physical Therapist                               PT OP Goals       09/28/17 0800       PT Short Term Goals    STG 1 Pt will be independent with ROM and intitial strengthening program for left knee.  -DM     STG 1 Progress Met  -DM     STG 2 Pt will demonstrate improved left knee ROM to -5 to 110  -DM     STG 2 Progress Progressing  -DM     STG 2 Progress Comments -5 to 125  -DM     STG 3 Pt will climb stairs with reciprocal gait using hand raill 100% of time  -DM     STG 3 Progress Partially Met  -DM     STG 3 Progress Comments Able to do 90% of time.  -DM     Long Term Goals    LTG 1 Pt will be independent with LE stabilization/strengthening  program for return to his preop functional level.  -DM     LTG 1 Progress Progressing  -DM     LTG 1 Progress Comments Progressed to using pulleys for more resistance.  -DM     LTG 2 Pt will demonstrate improved left knee ROM 0-120 degrees  -DM     LTG 2 Progress Partially Met  -DM     LTG 2 Progress Comments -5 to 125  -DM     LTG 3 Pt will demonstrate decreased knee edema by 2 cm to allow normal mobility of knee with all weight bearing tasks  -DM     LTG 3 Progress Progressing  -DM     LTG 3 Progress Comments decreased 1 cm to 44 cm  -DM     LTG 4 Pt will demonstrate improved strength in left knee by 1 grade for stability with all weight bearing tasks  -DM     LTG 4 Progress Progressing  -DM     LTG 4 Progress Comments Tolerating increased resistance without any increased pain.   -DM     LTG 5 Pt will report improved function via KOOS from 44% to 20% or less disability  -DM     LTG 5 Progress Progressing  -DM     LTG 5 Progress Comments Improved slightly to 40%  -DM       User Key  (r) = Recorded By, (t) = Taken By, (c) = Cosigned By    Initials Name Provider Type    JAMES Sarmiento PT Physical Therapist                    Outcome Measures       09/28/17 0900          Knee Outcome Score    Knee Outcome Score Comments 48/80 or 40% disability  -DM      Functional Assessment    Outcome Measure Options Knee Outcome Score- ADL  -DM        User Key  (r) = Recorded By, (t) = Taken By, (c) = Cosigned By    Initials Name Provider Type    JAMES Sarmiento PT Physical Therapist            Time Calculation:   Start Time: 0730  Stop Time: 0815  Time Calculation (min): 45 min    Therapy Charges for Today     Code Description Service Date Service Provider Modifiers Qty    15194268553 HC PT MOBILITY PROJECTED 9/28/2017 Rahel Sarmiento PT GP, CK 1    37580957620 HC PT MOBILITY DISCHARGE 9/28/2017 Rahel Sarmiento PT GP, CJ 1    52152350853 HC PT THER PROC EA 15 MIN 9/28/2017 Rahel Sarmiento PT GP 2    16391615633 HC PT HOT OR COLD  PACK TREAT MCARE 9/28/2017 Rahel Sarmiento, PT GP 1    74033293599 HC PT ELECTRICAL STIM UNATTENDED 9/28/2017 Rahel Sarmiento, PT  1          PT G-Codes  PT Professional Judgement Used?: Yes  Outcome Measure Options: Knee Outcome Score- ADL  Score: 48/80 or 40% disability  Mobility: Walking and Moving Around Goal Status (): At least 40 percent but less than 60 percent impaired, limited or restricted  Mobility: Walking and Moving Around Discharge Status (): At least 20 percent but less than 40 percent impaired, limited or restricted         Rahel Sarmiento, PT  9/28/2017

## 2017-10-03 ENCOUNTER — HOSPITAL ENCOUNTER (OUTPATIENT)
Dept: PHYSICAL THERAPY | Facility: HOSPITAL | Age: 72
Setting detail: THERAPIES SERIES
Discharge: HOME | End: 2017-10-03

## 2017-10-03 DIAGNOSIS — Z96.652 STATUS POST TOTAL LEFT KNEE REPLACEMENT: ICD-10-CM

## 2017-10-03 DIAGNOSIS — M25.662 KNEE STIFFNESS, LEFT: ICD-10-CM

## 2017-10-03 DIAGNOSIS — M25.562 ACUTE PAIN OF LEFT KNEE: Primary | ICD-10-CM

## 2017-10-03 PROCEDURE — 97035 APP MDLTY 1+ULTRASOUND EA 15: CPT | Performed by: PHYSICAL THERAPIST

## 2017-10-03 PROCEDURE — 97110 THERAPEUTIC EXERCISES: CPT | Performed by: PHYSICAL THERAPIST

## 2017-10-03 PROCEDURE — 97140 MANUAL THERAPY 1/> REGIONS: CPT | Performed by: PHYSICAL THERAPIST

## 2017-10-03 NOTE — THERAPY TREATMENT NOTE
Outpatient Physical Therapy Ortho Treatment Note  [facility]     Patient Name: Raheem Clay  : 1945  MRN: [id number]  Today's Date: 10/3/2017      Visit Date: 10/03/2017    Visit Dx:    ICD-10-CM ICD-9-CM   1. Acute pain of left knee M25.562 719.46   2. Knee stiffness, left M25.662 719.56   3. Status post total left knee replacement Z96.652 V43.65       Patient Active Problem List   Diagnosis   • Osteoarthritis, knee        Past Medical History:   Diagnosis Date   • Arthritis    • Back pain    • Coronary artery disease     cabg x 4   • Diabetes mellitus     type 2   • High cholesterol    • Hypertension    • Knee pain, left    • Sleep apnea         Past Surgical History:   Procedure Laterality Date   • APPENDECTOMY     • CARDIAC SURGERY      cabg x 4 2016   • ELBOW ARTHROSCOPY Left     golfers elbow   • FOOT SURGERY Right     trauma   crushed and skin grafts   • JOINT REPLACEMENT      lt knee   • [place] REVISE KNEE JOINT REPLACE,1 PART Left 2017    Procedure: LT TOTAL KNEE ARTHROPLASTY REVISION;  Surgeon: Elliott Mruphy MD;  Location: [facility];  Service: Orthopedics                             PT Assessment/Plan       10/03/17 0856       PT Assessment    Assessment Comments Able to get to full passive knee extension today in supine,  He still has a moderate amount of posterior knee discomfort with end range extension but now has full PROM into extension.   -DM     PT Plan    PT Plan Comments Cont with emphasis on knee extensions gains passively and actively.    -DM       User Key  (r) = Recorded By, (t) = Taken By, (c) = Cosigned By    Initials Name Provider Type    JAMES Sarmiento, PT Physical Therapist                Modalities       10/03/17 0700          Subjective Comments    Subjective Comments No new problems  -DM      Subjective Pain    Able to rate subjective pain? yes  -DM      Pre-Treatment Pain Level 3  -DM      Post-Treatment Pain Level 2  -DM      Ultrasound 28573     Location Posterior left knee  -DM      Rx Minutes 10  -DM      Duty Cycle 100  -DM      Frequency 3.0 MHz  -DM      Intensity - Wts/cm 1.5  -DM      Phonophoresis No  -DM        User Key  (r) = Recorded By, (t) = Taken By, (c) = Cosigned By    Initials Name Provider Type    DM Rahel Sarmiento PT Physical Therapist                Exercises       10/03/17 0700          Subjective Comments    Subjective Comments No new problems  -DM      Subjective Pain    Able to rate subjective pain? yes  -DM      Pre-Treatment Pain Level 3  -DM      Post-Treatment Pain Level 2  -DM      Exercise 1    Exercise Name 1 Began on Recumbent bike. Completed exercises as on flow sheet.   -DM      Cueing 1 Verbal;Tactile;Demo  -DM      Additional Comments Focuse on extension exercises.   -DM      Exercise 2    Exercise Name 2 Prone hang with 3# weight x 5 minutes.   -DM        User Key  (r) = Recorded By, (t) = Taken By, (c) = Cosigned By    Initials Name Provider Type    DM Rahel Sarmiento PT Physical Therapist                        Manual Rx (last 36 hours)      Manual Treatments       10/03/17 0700          Manual Rx 1    Manual Rx 1 Location Left knee prone  -DM      Manual Rx 1 Type STM to popliteal space and distal hamstring.  -DM      Manual Rx 1 Duration 10  -DM      Manual Rx 2    Manual Rx 2 Location Left knee  -DM      Manual Rx 2 Type supine joint moiblization for full flexion  -DM      Manual Rx 2 Duration 10  -DM        User Key  (r) = Recorded By, (t) = Taken By, (c) = Cosigned By    Initials Name Provider Type    JAMES Sarmiento PT Physical Therapist                        Time Calculation:   Start Time: 0730  Stop Time: 0815  Time Calculation (min): 45 min    Therapy Charges for Today     Code Description Service Date Service Provider Modifiers Qty    55325167534  PT THER PROC EA 15 MIN 10/3/2017 Rahel Sarmiento, PT GP 1    31738076698 HC PT ULTRASOUND EA 15 MIN 10/3/2017 Rahel Sarmiento, PT GP 1    [id number]  PT MANUAL  THERAPY EA 15 MIN 10/3/2017 Rahel Sarmiento, PT GP 1                    Rahel Sarmiento, PT  10/3/2017

## 2017-10-06 ENCOUNTER — HOSPITAL ENCOUNTER (OUTPATIENT)
Dept: PHYSICAL THERAPY | Facility: HOSPITAL | Age: 72
Setting detail: THERAPIES SERIES
Discharge: HOME | End: 2017-10-06

## 2017-10-06 DIAGNOSIS — Z96.652 STATUS POST TOTAL LEFT KNEE REPLACEMENT: ICD-10-CM

## 2017-10-06 DIAGNOSIS — M25.562 ACUTE PAIN OF LEFT KNEE: Primary | ICD-10-CM

## 2017-10-06 DIAGNOSIS — M25.662 KNEE STIFFNESS, LEFT: ICD-10-CM

## 2017-10-06 PROCEDURE — 97035 APP MDLTY 1+ULTRASOUND EA 15: CPT | Performed by: PHYSICAL THERAPIST

## 2017-10-06 PROCEDURE — 97110 THERAPEUTIC EXERCISES: CPT | Performed by: PHYSICAL THERAPIST

## 2017-10-06 PROCEDURE — 97140 MANUAL THERAPY 1/> REGIONS: CPT | Performed by: PHYSICAL THERAPIST

## 2017-10-06 NOTE — THERAPY TREATMENT NOTE
Outpatient Physical Therapy Ortho Treatment Note  [facility]     Patient Name: Raheem Clay  : 1945  MRN: [id number]  Today's Date: 10/6/2017      Visit Date: 10/06/2017    Visit Dx:    ICD-10-CM ICD-9-CM   1. Acute pain of left knee M25.562 719.46   2. Knee stiffness, left M25.662 719.56   3. Status post total left knee replacement Z96.652 V43.65       Patient Active Problem List   Diagnosis   • Osteoarthritis, knee        Past Medical History:   Diagnosis Date   • Arthritis    • Back pain    • Coronary artery disease     cabg x 4   • Diabetes mellitus     type 2   • High cholesterol    • Hypertension    • Knee pain, left    • Sleep apnea         Past Surgical History:   Procedure Laterality Date   • APPENDECTOMY     • CARDIAC SURGERY      cabg x 4 2016   • ELBOW ARTHROSCOPY Left     golfers elbow   • FOOT SURGERY Right     trauma   crushed and skin grafts   • JOINT REPLACEMENT      lt knee   • [place] REVISE KNEE JOINT REPLACE,1 PART Left 2017    Procedure: LT TOTAL KNEE ARTHROPLASTY REVISION;  Surgeon: Elliott Murphy MD;  Location: [facility];  Service: Orthopedics                             PT Assessment/Plan       10/06/17 7842       PT Assessment    Assessment Comments Tolerated PA glides for knee extension slightly better today.  Easily able to obtain full passive knee extension.   -DM     PT Plan    PT Plan Comments Cont with TKA strengthening and ROM program  -DM       User Key  (r) = Recorded By, (t) = Taken By, (c) = Cosigned By    Initials Name Provider Type    JAMES Sarmiento, PT Physical Therapist                Modalities       10/06/17 1700          Ultrasound 52614    Location Posterior left knee  -DM      Rx Minutes 10  -DM      Duty Cycle 100  -DM      Frequency 3.0 MHz  -DM      Intensity - Wts/cm 1.5  -DM      Phonophoresis No  -DM        User Key  (r) = Recorded By, (t) = Taken By, (c) = Cosigned By    Initials Name Provider Type    JAMES Sarmiento, PT Physical  Therapist                Exercises       10/06/17 1700          Subjective Comments    Subjective Comments While the treatment last time was painful, his knee feels better now  -DM      Subjective Pain    Able to rate subjective pain? yes  -DM      Pre-Treatment Pain Level 3  -DM      Post-Treatment Pain Level 1  -DM      Exercise 1    Exercise Name 1 Began on Recumbent bike. Completed exercises as on flow sheet.   -DM      Cueing 1 Verbal;Tactile;Demo  -DM      Exercise 2    Exercise Name 2 Prone hang with 3# weight x 5 minutes.   -DM        User Key  (r) = Recorded By, (t) = Taken By, (c) = Cosigned By    Initials Name Provider Type    DM Rahel Sarmiento, PT Physical Therapist                        Manual Rx (last 36 hours)      Manual Treatments       10/06/17 1700          Manual Rx 1    Manual Rx 1 Location Left knee prone  -DM      Manual Rx 1 Type STM to popliteal space and distal hamstring.  -DM      Manual Rx 1 Duration 10  -DM      Manual Rx 2    Manual Rx 2 Location Left knee  -DM      Manual Rx 2 Type STM and PROM of knee for ROM  -DM      Manual Rx 2 Duration 10  -DM        User Key  (r) = Recorded By, (t) = Taken By, (c) = Cosigned By    Initials Name Provider Type    JAMES Sarmiento, PT Physical Therapist                        Time Calculation:   Start Time: 1345  Stop Time: 1430  Time Calculation (min): 45 min    Therapy Charges for Today     Code Description Service Date Service Provider Modifiers Qty    94341683070 HC PT THER PROC EA 15 MIN 10/6/2017 Rahel Sarmiento, PT GP 1    06150743932 HC PT ULTRASOUND EA 15 MIN 10/6/2017 Rahel Sarmiento, PT GP 1    61193398095 HC PT MANUAL THERAPY EA 15 MIN 10/6/2017 Rahel Sarmiento, PT GP 1                    Rahel Sarmiento, PT  10/6/2017

## 2017-10-13 ENCOUNTER — HOSPITAL ENCOUNTER (OUTPATIENT)
Dept: PHYSICAL THERAPY | Facility: HOSPITAL | Age: 72
Setting detail: THERAPIES SERIES
Discharge: HOME | End: 2017-10-13

## 2017-10-13 DIAGNOSIS — M25.562 ACUTE PAIN OF LEFT KNEE: Primary | ICD-10-CM

## 2017-10-13 DIAGNOSIS — M25.662 KNEE STIFFNESS, LEFT: ICD-10-CM

## 2017-10-13 DIAGNOSIS — Z96.652 STATUS POST TOTAL LEFT KNEE REPLACEMENT: ICD-10-CM

## 2017-10-13 PROCEDURE — 97140 MANUAL THERAPY 1/> REGIONS: CPT | Performed by: PHYSICAL THERAPIST

## 2017-10-13 PROCEDURE — 97035 APP MDLTY 1+ULTRASOUND EA 15: CPT | Performed by: PHYSICAL THERAPIST

## 2017-10-13 PROCEDURE — 97110 THERAPEUTIC EXERCISES: CPT | Performed by: PHYSICAL THERAPIST

## 2017-10-13 NOTE — THERAPY TREATMENT NOTE
"    Outpatient Physical Therapy Ortho Treatment Note  [facility]     Patient Name: Raheem Clay  : 1945  MRN: [id number]  Today's Date: 10/13/2017      Visit Date: 10/13/2017    Visit Dx:    ICD-10-CM ICD-9-CM   1. Acute pain of left knee M25.562 719.46   2. Knee stiffness, left M25.662 719.56   3. Status post total left knee replacement Z96.652 V43.65       Patient Active Problem List   Diagnosis   • Osteoarthritis, knee        Past Medical History:   Diagnosis Date   • Arthritis    • Back pain    • Coronary artery disease     cabg x 4   • Diabetes mellitus     type 2   • High cholesterol    • Hypertension    • Knee pain, left    • Sleep apnea         Past Surgical History:   Procedure Laterality Date   • APPENDECTOMY     • CARDIAC SURGERY      cabg x 4 2016   • ELBOW ARTHROSCOPY Left     golfers elbow   • FOOT SURGERY Right     trauma   crushed and skin grafts   • JOINT REPLACEMENT      lt knee   • [place] REVISE KNEE JOINT REPLACE,1 PART Left 2017    Procedure: LT TOTAL KNEE ARTHROPLASTY REVISION;  Surgeon: Elliott Murphy MD;  Location: [facility];  Service: Orthopedics                             PT Assessment/Plan       10/13/17 9676       PT Assessment    Assessment Comments Patient able to demonstrate some independence with his strengthening routine and had 0-122 degrees of left knee AROM post treatment. Palpable \"popping\" felt in patient's knee joint that is concerning- It feels as though there is a component loose in the knee. Educated patient on seeking MD advise to ensure everything is alligned properly.   -     PT Plan    PT Plan Comments cont. with TKA strengthening and ROM program  -       User Key  (r) = Recorded By, (t) = Taken By, (c) = Cosigned By    Initials Name Provider Type    EMMETT Flores PT Physical Therapist                Modalities       10/13/17 1500          Subjective Comments    Subjective Comments I have this clicking in my knee when I kick it straight out " or when I walk  -KH      Subjective Pain    Pre-Treatment Pain Level 3  -KH      Post-Treatment Pain Level 1  -KH      Ultrasound 20768    Location Posterior left knee  -KH      Rx Minutes 10  -KH      Duty Cycle 100  -KH      Frequency 3.0 MHz  -KH      Intensity - Wts/cm 1.5  -KH      Phonophoresis No  -KH        User Key  (r) = Recorded By, (t) = Taken By, (c) = Cosigned By    Initials Name Provider Type    EMMETT Flores PT Physical Therapist                Exercises       10/13/17 1500          Subjective Comments    Subjective Comments I have this clicking in my knee when I kick it straight out or when I walk  -KH      Subjective Pain    Able to rate subjective pain? yes  -KH      Pre-Treatment Pain Level 3  -KH      Post-Treatment Pain Level 1  -KH      Exercise 1    Exercise Name 1 Began on Recumbent bike. Completed exercises as on flow sheet.   -KH      Cueing 1 Verbal;Tactile;Demo  -KH        User Key  (r) = Recorded By, (t) = Taken By, (c) = Cosigned By    Initials Name Provider Type    EMMETT Flores PT Physical Therapist                        Manual Rx (last 36 hours)      Manual Treatments       10/13/17 1500          Manual Rx 2    Manual Rx 2 Location Left knee  -KH      Manual Rx 2 Type STM and PROM of knee for ROM  -KH      Manual Rx 2 Duration 10  -KH        User Key  (r) = Recorded By, (t) = Taken By, (c) = Cosigned By    Initials Name Provider Type    EMMETT Flores PT Physical Therapist                        Time Calculation:   Start Time: 1515  Stop Time: 1555  Time Calculation (min): 40 min    Therapy Charges for Today     Code Description Service Date Service Provider Modifiers Qty    63795203217 HC PT THER PROC EA 15 MIN 10/13/2017 Amee Flores PT GP 1    54573570193 HC PT ULTRASOUND EA 15 MIN 10/13/2017 Amee Flores PT GP 1    91920190786 HC PT MANUAL THERAPY EA 15 MIN 10/13/2017 Amee Flores PT GP 1                    Amee Flores PT  10/13/2017

## 2017-10-17 ENCOUNTER — APPOINTMENT (OUTPATIENT)
Dept: PHYSICAL THERAPY | Facility: HOSPITAL | Age: 72
End: 2017-10-17

## 2017-10-19 ENCOUNTER — HOSPITAL ENCOUNTER (OUTPATIENT)
Dept: PHYSICAL THERAPY | Facility: HOSPITAL | Age: 72
Setting detail: THERAPIES SERIES
End: 2017-10-19

## 2017-10-19 DIAGNOSIS — Z96.652 STATUS POST TOTAL LEFT KNEE REPLACEMENT: ICD-10-CM

## 2017-10-19 DIAGNOSIS — M25.562 ACUTE PAIN OF LEFT KNEE: Primary | ICD-10-CM

## 2017-10-19 DIAGNOSIS — M25.662 KNEE STIFFNESS, LEFT: ICD-10-CM

## 2017-10-24 ENCOUNTER — HOSPITAL ENCOUNTER (OUTPATIENT)
Dept: PHYSICAL THERAPY | Facility: HOSPITAL | Age: 72
Setting detail: THERAPIES SERIES
Discharge: HOME | End: 2017-10-24

## 2017-10-24 DIAGNOSIS — Z96.652 STATUS POST TOTAL LEFT KNEE REPLACEMENT: ICD-10-CM

## 2017-10-24 DIAGNOSIS — M25.562 ACUTE PAIN OF LEFT KNEE: Primary | ICD-10-CM

## 2017-10-24 DIAGNOSIS — M25.662 KNEE STIFFNESS, LEFT: ICD-10-CM

## 2017-10-24 PROCEDURE — 97110 THERAPEUTIC EXERCISES: CPT | Performed by: PHYSICAL THERAPIST

## 2017-10-24 PROCEDURE — G0283 ELEC STIM OTHER THAN WOUND: HCPCS | Performed by: PHYSICAL THERAPIST

## 2017-10-24 NOTE — THERAPY PROGRESS REPORT/RE-CERT
Outpatient Physical Therapy Ortho Progress Note  [facility]     Patient Name: Raheem Clay  : 1945  MRN: [id number]  Today's Date: 10/24/2017      Visit Date: 10/24/2017    Patient Active Problem List   Diagnosis   • Osteoarthritis, knee        Past Medical History:   Diagnosis Date   • Arthritis    • Back pain    • Coronary artery disease     cabg x 4   • Diabetes mellitus     type 2   • High cholesterol    • Hypertension    • Knee pain, left    • Sleep apnea         Past Surgical History:   Procedure Laterality Date   • APPENDECTOMY     • CARDIAC SURGERY      cabg x 4 2016   • ELBOW ARTHROSCOPY Left     golfers elbow   • FOOT SURGERY Right     trauma   crushed and skin grafts   • JOINT REPLACEMENT      lt knee   • [place] REVISE KNEE JOINT REPLACE,1 PART Left 2017    Procedure: LT TOTAL KNEE ARTHROPLASTY REVISION;  Surgeon: Elliott Murphy MD;  Location: [facility];  Service: Orthopedics       Visit Dx:     ICD-10-CM ICD-9-CM   1. Acute pain of left knee M25.562 719.46   2. Knee stiffness, left M25.662 719.56   3. Status post total left knee replacement Z96.652 V43.65                                     PT OP Goals       10/24/17 1500       PT Short Term Goals    STG 1 Pt will be independent with ROM and intitial strengthening program for left knee.  -DM     STG 1 Progress Met  -DM     STG 2 Pt will demonstrate improved left knee ROM to -5 to 110  -DM     STG 2 Progress Progressing  -DM     STG 2 Progress Comments -3 to 130  -DM     STG 3 Pt will climb stairs with reciprocal gait using hand raill 100% of time  -DM     STG 3 Progress Met  -DM     Long Term Goals    LTG 1 Pt will be independent with LE stabilization/strengthening program for return to his preop functional level.  -DM     LTG 1 Progress Progressing  -DM     LTG 1 Progress Comments Being progress on equipment, mat and pulleys  -DM     LTG 2 Pt will demonstrate improved left knee ROM 0-120 degrees  -DM     LTG 2 Progress  Partially Met  -DM     LTG 2 Progress Comments -5 to 130  -DM     LTG 3 Pt will demonstrate decreased knee edema by 2 cm to allow normal mobility of knee with all weight bearing tasks  -DM     LTG 3 Progress Progressing  -DM     LTG 4 Pt will demonstrate improved strength in left knee by 1 grade for stability with all weight bearing tasks  -DM     LTG 4 Progress Progressing  -DM     LTG 5 Pt will report improved function via KOOS from 44% to 20% or less disability  -DM     LTG 5 Progress Progressing  -DM     LTG 5 Progress Comments 40%  -DM       User Key  (r) = Recorded By, (t) = Taken By, (c) = Cosigned By    Initials Name Provider Type    JAMES Sarmiento, PT Physical Therapist                PT Assessment/Plan       10/24/17 1600       PT Assessment    Assessment Comments Raheem Clay has completed 14 sessions of skilled physical therapy s/p left TKA revision.  He quickly gained knee flexion but still lacks 3-5 degrees of knee extension.  He began having increased episodes of clicking/clunking in knee with extension.  He was assessed by RN and assured that the components were stable but that his ligaments were loose.  He has been reinstructed in a full knee strengthening program with emphasis on straight knee and closed chain strenthening for added stabilization.  He will benefit from continued skilled physical therapy for continued strengthening and stabilization.  -DM     PT Plan    PT Plan Comments Cont with strengthening and stabilization.   -DM       User Key  (r) = Recorded By, (t) = Taken By, (c) = Cosigned By    Initials Name Provider Type    JAMES Sarmiento, PT Physical Therapist                Modalities       10/24/17 1500          Ice    Ice Applied Yes  -DM      Location left knee  -DM      Rx Minutes 15 mins  -DM      ELECTRICAL STIMULATION    Attended/Unattended Unattended  -DM      Stimulation Type IFC  -DM      Max mAmp 11.2   Gynesis unit  -DM      Location/Electrode Placement/Other crossed  pattern on posterior left knee  -DM      Rx Minutes 15 mins  -DM        User Key  (r) = Recorded By, (t) = Taken By, (c) = Cosigned By    Initials Name Provider Type    JAMES Sarmiento PT Physical Therapist              Exercises       10/24/17 1500          Subjective Comments    Subjective Comments Saw Dr. Murphy's nurse.  She felt the components were stable but he had loose ligaments and needed to get his knee stronger.  They were pleased with his ROM  -DM      Subjective Pain    Pre-Treatment Pain Level 3  -DM      Post-Treatment Pain Level 0  -DM      Exercise 1    Exercise Name 1 Began on Recumbent bike. Completed exercises as on flow sheet.   -DM      Cueing 1 Verbal;Tactile;Demo  -DM      Additional Comments Struggled some with pulleys and weigh bearing exercises due to leg fatigue  -DM      Exercise 2    Exercise Name 2 Re-instructed and progressed in HEP:  SLR, hip abd, hip ext, hip add, prone RTE, seated ham curl with GTB, LAQ with GTB all 2 x 10.  -DM      Cueing 2 Verbal;Tactile;Demo  -DM        User Key  (r) = Recorded By, (t) = Taken By, (c) = Cosigned By    Initials Name Provider Type    JAMES Sarmiento, PT Physical Therapist                              Time Calculation:   Start Time: 1430  Stop Time: 1515  Time Calculation (min): 45 min     Therapy Charges for Today     Code Description Service Date Service Provider Modifiers Qty    85446582317 HC PT THER PROC EA 15 MIN 10/24/2017 Rahel Sarmiento, PT GP 2    12586374153 HC PT HOT OR COLD PACK TREAT MCARE 10/24/2017 Rahel Sarmiento, PT GP 1    58281115203 HC PT ELECTRICAL STIM UNATTENDED 10/24/2017 Rahel Sarmiento, PT  1                    Rahel aSrmiento, PT  10/24/2017

## 2017-10-26 ENCOUNTER — HOSPITAL ENCOUNTER (OUTPATIENT)
Dept: PHYSICAL THERAPY | Facility: HOSPITAL | Age: 72
Setting detail: THERAPIES SERIES
Discharge: HOME | End: 2017-10-26

## 2017-10-26 DIAGNOSIS — Z96.652 STATUS POST TOTAL LEFT KNEE REPLACEMENT: ICD-10-CM

## 2017-10-26 DIAGNOSIS — M25.662 KNEE STIFFNESS, LEFT: ICD-10-CM

## 2017-10-26 DIAGNOSIS — M25.562 ACUTE PAIN OF LEFT KNEE: Primary | ICD-10-CM

## 2017-10-26 PROCEDURE — G0283 ELEC STIM OTHER THAN WOUND: HCPCS | Performed by: PHYSICAL THERAPIST

## 2017-10-26 PROCEDURE — 97110 THERAPEUTIC EXERCISES: CPT | Performed by: PHYSICAL THERAPIST

## 2017-10-26 NOTE — THERAPY TREATMENT NOTE
Outpatient Physical Therapy Ortho Treatment Note  [facility]     Patient Name: Raheem Clay  : 1945  MRN: [id number]  Today's Date: 10/26/2017      Visit Date: 10/26/2017    Visit Dx:    ICD-10-CM ICD-9-CM   1. Acute pain of left knee M25.562 719.46   2. Knee stiffness, left M25.662 719.56   3. Status post total left knee replacement Z96.652 V43.65       Patient Active Problem List   Diagnosis   • Osteoarthritis, knee        Past Medical History:   Diagnosis Date   • Arthritis    • Back pain    • Coronary artery disease     cabg x 4   • Diabetes mellitus     type 2   • High cholesterol    • Hypertension    • Knee pain, left    • Sleep apnea         Past Surgical History:   Procedure Laterality Date   • APPENDECTOMY     • CARDIAC SURGERY      cabg x 4 2016   • ELBOW ARTHROSCOPY Left     golfers elbow   • FOOT SURGERY Right     trauma   crushed and skin grafts   • JOINT REPLACEMENT      lt knee   • MI REVISE KNEE JOINT REPLACE,1 PART Left 2017    Procedure: LT TOTAL KNEE ARTHROPLASTY REVISION;  Surgeon: Elliott Murphy MD;  Location: [facility];  Service: Orthopedics                             PT Assessment/Plan       10/26/17 1341       PT Assessment    Assessment Comments Mr. Clay did better with exercise and did not fatigue as easily today.  Did lower weight of pulleys to 2.5# with leg swings.  Began balance/stability exercises and with knee bent he did begin to fatigue.   -DM     PT Plan    PT Plan Comments Cont with stabilization and balance tasks.   -DM       User Key  (r) = Recorded By, (t) = Taken By, (c) = Cosigned By    Initials Name Provider Type    JAMES Sarmiento, PT Physical Therapist                Modalities       10/26/17 1300          Subjective Comments    Subjective Comments Not as sore in back of leg but when he exercises for stands for extended periods of time he will have increased soreness.  -DM      Subjective Pain    Able to rate subjective pain? yes  -DM       Pre-Treatment Pain Level 2  -DM      Post-Treatment Pain Level 0  -DM      Ice    Ice Applied Yes  -DM      Location left knee  -DM      Rx Minutes 15 mins  -DM      ELECTRICAL STIMULATION    Attended/Unattended Unattended  -DM      Stimulation Type IFC  -DM      Max mAmp 11.2  -DM      Location/Electrode Placement/Other crossed pattern on posterior left knee  -DM      Rx Minutes 15 mins  -DM        User Key  (r) = Recorded By, (t) = Taken By, (c) = Cosigned By    Initials Name Provider Type    JAMES Samriento, PT Physical Therapist                Exercises       10/26/17 1300          Subjective Comments    Subjective Comments Not as sore in back of leg but when he exercises for stands for extended periods of time he will have increased soreness.  -DM      Subjective Pain    Able to rate subjective pain? yes  -DM      Pre-Treatment Pain Level 2  -DM      Post-Treatment Pain Level 0  -DM      Exercise 1    Exercise Name 1 Began on Recumbent bike. Completed exercises as on flow sheet.   -DM      Cueing 1 Verbal;Tactile  -DM      Exercise 2    Exercise Name 2 Progressed with stability training - forward/backward stepping with weight on left, knee straight and bendt x 10 each position.  -DM      Cueing 2 Verbal;Tactile;Demo  -DM        User Key  (r) = Recorded By, (t) = Taken By, (c) = Cosigned By    Initials Name Provider Type    JAMES Sarmiento, JOSE Physical Therapist                                       Time Calculation:   Start Time: 0945  Stop Time: 1030  Time Calculation (min): 45 min    Therapy Charges for Today     Code Description Service Date Service Provider Modifiers Qty    86447502185 HC PT THER PROC EA 15 MIN 10/26/2017 Rahel Sarmiento, PT GP 2    64660131493 HC PT HOT OR COLD PACK TREAT MCARE 10/26/2017 Rahel Sarmiento, PT GP 1    76228870596 HC PT ELECTRICAL STIM UNATTENDED 10/26/2017 Rahel Sarmiento, PT  1                    Rahel Sarmiento, PT  10/26/2017

## 2017-11-03 ENCOUNTER — HOSPITAL ENCOUNTER (OUTPATIENT)
Dept: PHYSICAL THERAPY | Facility: HOSPITAL | Age: 72
Setting detail: THERAPIES SERIES
Discharge: HOME | End: 2017-11-03

## 2017-11-03 DIAGNOSIS — M25.562 ACUTE PAIN OF LEFT KNEE: Primary | ICD-10-CM

## 2017-11-03 DIAGNOSIS — M25.662 KNEE STIFFNESS, LEFT: ICD-10-CM

## 2017-11-03 DIAGNOSIS — Z96.652 STATUS POST TOTAL LEFT KNEE REPLACEMENT: ICD-10-CM

## 2017-11-03 PROCEDURE — 97110 THERAPEUTIC EXERCISES: CPT | Performed by: PHYSICAL THERAPIST

## 2017-11-03 PROCEDURE — 97140 MANUAL THERAPY 1/> REGIONS: CPT | Performed by: PHYSICAL THERAPIST

## 2017-11-03 NOTE — THERAPY TREATMENT NOTE
Outpatient Physical Therapy Ortho Treatment Note  [facility]     Patient Name: Raheem Clay  : 1945  MRN: [id number]  Today's Date: 11/3/2017      Visit Date: 2017    Visit Dx:    ICD-10-CM ICD-9-CM   1. Acute pain of left knee M25.562 719.46   2. Knee stiffness, left M25.662 719.56   3. Status post total left knee replacement Z96.652 V43.65       Patient Active Problem List   Diagnosis   • Osteoarthritis, knee        Past Medical History:   Diagnosis Date   • Arthritis    • Back pain    • Coronary artery disease     cabg x 4   • Diabetes mellitus     type 2   • High cholesterol    • Hypertension    • Knee pain, left    • Sleep apnea         Past Surgical History:   Procedure Laterality Date   • APPENDECTOMY     • CARDIAC SURGERY      cabg x 4 2016   • ELBOW ARTHROSCOPY Left     golfers elbow   • FOOT SURGERY Right     trauma   crushed and skin grafts   • JOINT REPLACEMENT      lt knee   • CT REVISE KNEE JOINT REPLACE,1 PART Left 2017    Procedure: LT TOTAL KNEE ARTHROPLASTY REVISION;  Surgeon: Elliott Murphy MD;  Location: [facility];  Service: Orthopedics                             PT Assessment/Plan       17 1117       PT Assessment    Assessment Comments Progressed with balance strengthening exercises to engage full knee extension.  Fatigued with exercise but was able to continue with rest of program.  Still with stiffness and pain with end range extension.  -DM     PT Plan    PT Plan Comments Cont with stabilization, balance tasks and extension mobilization.   -DM       User Key  (r) = Recorded By, (t) = Taken By, (c) = Cosigned By    Initials Name Provider Type    JAMES Sarmiento, PT Physical Therapist                    Exercises       17 1100          Subjective Comments    Subjective Comments Still with some tightness in the posterior knee  -DM      Subjective Pain    Able to rate subjective pain? yes  -DM      Pre-Treatment Pain Level 2  -DM       Post-Treatment Pain Level 2  -DM      Exercise 1    Exercise Name 1 Began on Recumbent bike. Completed exercises as on flow sheet.   -DM      Exercise 2    Exercise Name 2 Progressed with stepping exercises with pulleys.  Belt at waist with 10# on pulleys walked forward, lateral and backward with CGA for balance.  all x 5 each direction.  -DM      Cueing 2 Verbal;Tactile;Demo  -DM        User Key  (r) = Recorded By, (t) = Taken By, (c) = Cosigned By    Initials Name Provider Type    JAMES Sarmiento, PT Physical Therapist                        Manual Rx (last 36 hours)      Manual Treatments       11/03/17 1100          Manual Rx 1    Manual Rx 1 Location Left knee supine over MH to back of knee  -DM      Manual Rx 1 Type AP joint mobilization for knee extension gains.   -DM      Manual Rx 1 Duration 10  -DM      Manual Rx 2    Manual Rx 2 Location Left knee  -DM      Manual Rx 2 Type STM and PROM of knee for ROM  -DM      Manual Rx 2 Duration 5  -DM        User Key  (r) = Recorded By, (t) = Taken By, (c) = Cosigned By    Initials Name Provider Type    JAMES Sarmiento PT Physical Therapist                        Time Calculation:   Start Time: 0730  Stop Time: 0815  Time Calculation (min): 45 min    Therapy Charges for Today     Code Description Service Date Service Provider Modifiers Qty    49613709403 HC PT THER PROC EA 15 MIN 11/3/2017 Rahel Sarmiento, PT GP 2    86231338629 HC PT MANUAL THERAPY EA 15 MIN 11/3/2017 Rahel Sarmiento PT GP 1                    Rahel Sarmiento PT  11/3/2017

## 2017-11-10 ENCOUNTER — HOSPITAL ENCOUNTER (OUTPATIENT)
Dept: PHYSICAL THERAPY | Facility: HOSPITAL | Age: 72
Setting detail: THERAPIES SERIES
Discharge: HOME | End: 2017-11-10

## 2017-11-10 DIAGNOSIS — M25.662 KNEE STIFFNESS, LEFT: ICD-10-CM

## 2017-11-10 DIAGNOSIS — M25.562 ACUTE PAIN OF LEFT KNEE: Primary | ICD-10-CM

## 2017-11-10 DIAGNOSIS — Z96.652 STATUS POST TOTAL LEFT KNEE REPLACEMENT: ICD-10-CM

## 2017-11-10 PROCEDURE — 97110 THERAPEUTIC EXERCISES: CPT | Performed by: PHYSICAL THERAPIST

## 2017-11-10 PROCEDURE — 97140 MANUAL THERAPY 1/> REGIONS: CPT | Performed by: PHYSICAL THERAPIST

## 2017-11-10 NOTE — THERAPY TREATMENT NOTE
Outpatient Physical Therapy Ortho Treatment Note  [facility]     Patient Name: Raheem Clay  : 1945  MRN: [id number]  Today's Date: 11/10/2017      Visit Date: 11/10/2017    Visit Dx:    ICD-10-CM ICD-9-CM   1. Acute pain of left knee M25.562 719.46   2. Knee stiffness, left M25.662 719.56   3. Status post total left knee replacement Z96.652 V43.65       Patient Active Problem List   Diagnosis   • Osteoarthritis, knee        Past Medical History:   Diagnosis Date   • Arthritis    • Back pain    • Coronary artery disease     cabg x 4   • Diabetes mellitus     type 2   • High cholesterol    • Hypertension    • Knee pain, left    • Sleep apnea         Past Surgical History:   Procedure Laterality Date   • APPENDECTOMY     • CARDIAC SURGERY      cabg x 4 2016   • ELBOW ARTHROSCOPY Left     golfers elbow   • FOOT SURGERY Right     trauma   crushed and skin grafts   • JOINT REPLACEMENT      lt knee   • [place] REVISE KNEE JOINT REPLACE,1 PART Left 2017    Procedure: LT TOTAL KNEE ARTHROPLASTY REVISION;  Surgeon: Elliott Murphy MD;  Location: [facility];  Service: Orthopedics                             PT Assessment/Plan       11/10/17 1514       PT Assessment    Assessment Comments SLowly gaining knee extension.  Passively he has full knee extension.  -DM     PT Plan    PT Plan Comments See two more times for TKA program  -DM       User Key  (r) = Recorded By, (t) = Taken By, (c) = Cosigned By    Initials Name Provider Type    JAMES Sarmiento, PT Physical Therapist                    Exercises       11/10/17 1500          Subjective Comments    Subjective Comments Saw Dr. Murphy Tuesday and he felt he was doing well.  He is to return in one year for check up.   -DM      Subjective Pain    Able to rate subjective pain? yes  -DM      Pre-Treatment Pain Level 1  -DM      Post-Treatment Pain Level 1  -DM      Exercise 1    Exercise Name 1 Began on Recumbent bike. Completed exercises as on flow  sheet.   -DM        User Key  (r) = Recorded By, (t) = Taken By, (c) = Cosigned By    Initials Name Provider Type    JAMES Sarmiento, PT Physical Therapist                        Manual Rx (last 36 hours)      Manual Treatments       11/10/17 1500          Manual Rx 1    Manual Rx 1 Location Left knee supine to back of knee  -DM      Manual Rx 1 Type AP joint mobilization for knee extension gains.   -DM      Manual Rx 1 Duration 10  -DM      Manual Rx 2    Manual Rx 2 Location Left knee  -DM      Manual Rx 2 Type PROM of knee for ROM  -DM      Manual Rx 2 Duration 5  -DM        User Key  (r) = Recorded By, (t) = Taken By, (c) = Cosigned By    Initials Name Provider Type    JAMES Sarmiento, PT Physical Therapist                PT OP Goals       11/10/17 1500       PT Short Term Goals    STG 1 Pt will be independent with ROM and intitial strengthening program for left knee.  -DM     STG 1 Progress Met  -DM     STG 2 Pt will demonstrate improved left knee ROM to -5 to 110  -DM     STG 2 Progress Met  -DM     STG 2 Progress Comments PROM 0-130  -DM     STG 3 Pt will climb stairs with reciprocal gait using hand raill 100% of time  -DM     STG 3 Progress Met  -DM     Long Term Goals    LTG 1 Pt will be independent with LE stabilization/strengthening program for return to his preop functional level.  -DM     LTG 1 Progress Progressing  -DM     LTG 2 Pt will demonstrate improved left knee ROM 0-120 degrees  -DM     LTG 2 Progress Partially Met  -DM     LTG 2 Progress Comments -3 to 130  -DM     LTG 3 Pt will demonstrate decreased knee edema by 2 cm to allow normal mobility of knee with all weight bearing tasks  -DM     LTG 3 Progress Progressing  -DM     LTG 4 Pt will demonstrate improved strength in left knee by 1 grade for stability with all weight bearing tasks  -DM     LTG 4 Progress Progressing  -DM     LTG 5 Pt will report improved function via KOOS from 44% to 20% or less disability  -DM     LTG 5 Progress  Progressing  -DM       User Key  (r) = Recorded By, (t) = Taken By, (c) = Cosigned By    Initials Name Provider Type    DM Rahel Sarmiento, PT Physical Therapist                    Time Calculation:   Start Time: 1430  Stop Time: 1515  Time Calculation (min): 45 min    Therapy Charges for Today     Code Description Service Date Service Provider Modifiers Qty    85590613664  PT THER PROC EA 15 MIN 11/10/2017 Rahel Sarmiento, PT GP 2    [id number]  PT MANUAL THERAPY EA 15 MIN 11/10/2017 Rahel Sarmiento, PT GP 1                    Rahel Sarmiento, PT  11/10/2017

## 2017-11-17 ENCOUNTER — HOSPITAL ENCOUNTER (OUTPATIENT)
Dept: PHYSICAL THERAPY | Facility: HOSPITAL | Age: 72
Setting detail: THERAPIES SERIES
Discharge: HOME | End: 2017-11-17

## 2017-11-17 DIAGNOSIS — M25.562 ACUTE PAIN OF LEFT KNEE: Primary | ICD-10-CM

## 2017-11-17 DIAGNOSIS — Z96.652 STATUS POST TOTAL LEFT KNEE REPLACEMENT: ICD-10-CM

## 2017-11-17 DIAGNOSIS — M25.662 KNEE STIFFNESS, LEFT: ICD-10-CM

## 2017-11-17 PROCEDURE — G8979 MOBILITY GOAL STATUS: HCPCS | Performed by: PHYSICAL THERAPIST

## 2017-11-17 PROCEDURE — 97140 MANUAL THERAPY 1/> REGIONS: CPT | Performed by: PHYSICAL THERAPIST

## 2017-11-17 PROCEDURE — G8980 MOBILITY D/C STATUS: HCPCS | Performed by: PHYSICAL THERAPIST

## 2017-11-17 PROCEDURE — 97110 THERAPEUTIC EXERCISES: CPT | Performed by: PHYSICAL THERAPIST

## 2017-11-17 NOTE — THERAPY DISCHARGE NOTE
Outpatient Physical Therapy Ortho Treatment Note/Discharge Summary  [facility]     Patient Name: Raheem Clay  : 1945  MRN: [id number]  Today's Date: 2017      Visit Date: 2017    Visit Dx:    ICD-10-CM ICD-9-CM   1. Acute pain of left knee M25.562 719.46   2. Knee stiffness, left M25.662 719.56   3. Status post total left knee replacement Z96.652 V43.65       Patient Active Problem List   Diagnosis   • Osteoarthritis, knee        Past Medical History:   Diagnosis Date   • Arthritis    • Back pain    • Coronary artery disease     cabg x 4   • Diabetes mellitus     type 2   • High cholesterol    • Hypertension    • Knee pain, left    • Sleep apnea         Past Surgical History:   Procedure Laterality Date   • APPENDECTOMY     • CARDIAC SURGERY      cabg x 4 2016   • ELBOW ARTHROSCOPY Left     golfers elbow   • FOOT SURGERY Right     trauma   crushed and skin grafts   • JOINT REPLACEMENT      lt knee   • [place] REVISE KNEE JOINT REPLACE,1 PART Left 2017    Procedure: LT TOTAL KNEE ARTHROPLASTY REVISION;  Surgeon: Elliott Murphy MD;  Location: [facility];  Service: Orthopedics                             PT Assessment/Plan       17 1510       PT Assessment    Assessment Comments Raheem Clay has completed 18 sessions of physical therapy s/p left TKA revision.  He has done well with PT with improved ROM 0-130 and Active extension to -5.  His strength is 5/5 and his gait is normalized.  He has returned to work and his previous activity level. He is ready for discharge.   -DM     PT Plan    PT Plan Comments DC PT  -DM       User Key  (r) = Recorded By, (t) = Taken By, (c) = Cosigned By    Initials Name Provider Type    JAMES Sarmiento, PT Physical Therapist                    Exercises       17 1500          Subjective Comments    Subjective Comments The knee is feeling pretty good.    -DM      Subjective Pain    Able to rate subjective pain? yes  -DM      Pre-Treatment  Pain Level 1  -DM      Post-Treatment Pain Level 1  -DM      Exercise 1    Exercise Name 1 Completed exercises as on flow sheet.  -DM        User Key  (r) = Recorded By, (t) = Taken By, (c) = Cosigned By    Initials Name Provider Type    JAMES Sarmiento, PT Physical Therapist                        Manual Rx (last 36 hours)      Manual Treatments       11/17/17 1500          Manual Rx 1    Manual Rx 1 Location Left knee supine to back of knee  -DM      Manual Rx 1 Type AP joint mobilization for knee extension gains.   -DM      Manual Rx 1 Duration 10  -DM        User Key  (r) = Recorded By, (t) = Taken By, (c) = Cosigned By    Initials Name Provider Type    JAMES Sarmiento, PT Physical Therapist                PT OP Goals       11/17/17 1000       PT Short Term Goals    STG 1 Pt will be independent with ROM and intitial strengthening program for left knee.  -DM     STG 1 Progress Met  -DM     STG 2 Pt will demonstrate improved left knee ROM to -5 to 110  -DM     STG 2 Progress Met  -DM     STG 3 Pt will climb stairs with reciprocal gait using hand raill 100% of time  -DM     STG 3 Progress Met  -DM     Long Term Goals    LTG 1 Pt will be independent with LE stabilization/strengthening program for return to his preop functional level.  -DM     LTG 1 Progress Met  -DM     LTG 2 Pt will demonstrate improved left knee ROM 0-120 degrees  -DM     LTG 2 Progress Met  -DM     LTG 2 Progress Comments 0-130  -DM     LTG 3 Pt will demonstrate decreased knee edema by 2 cm to allow normal mobility of knee with all weight bearing tasks  -DM     LTG 3 Progress Met  -DM     LTG 3 Progress Comments 43 cm  -DM     LTG 4 Pt will demonstrate improved strength in left knee by 1 grade for stability with all weight bearing tasks  -DM     LTG 4 Progress Met  -DM     LTG 4 Progress Comments Knee strength is 5/5  -DM     LTG 5 Pt will report improved function via KOOS from 44% to 20% or less disability  -DM     LTG 5 Progress Met  -DM      LTG 5 Progress Comments 11%  -DM       User Key  (r) = Recorded By, (t) = Taken By, (c) = Cosigned By    Initials Name Provider Type    DM Rahel Sarmiento, PT Physical Therapist                    Time Calculation:   Start Time: 0945  Stop Time: 1030  Time Calculation (min): 45 min    Therapy Charges for Today     Code Description Service Date Service Provider Modifiers Qty    28191351181 HC PT MOBILITY PROJECTED 11/17/2017 Rahel Sarmiento, PT GP, CI 1    12385261420 HC PT MOBILITY DISCHARGE 11/17/2017 Rahel Sarmiento PT GP, CI 1    25449153170 HC PT THER PROC EA 15 MIN 11/17/2017 Rahel Sarmiento, PT GP 2    27879853056 HC PT MANUAL THERAPY EA 15 MIN 11/17/2017 Rahel Sarmiento, PT GP 1          PT G-Codes  PT Professional Judgement Used?: Yes  Outcome Measure Options: Knee Outcome Score- ADL  Score: 11%  Functional Limitation: Mobility: Walking and moving around  Mobility: Walking and Moving Around Goal Status (): At least 1 percent but less than 20 percent impaired, limited or restricted  Mobility: Walking and Moving Around Discharge Status (): At least 1 percent but less than 20 percent impaired, limited or restricted     OP PT Discharge Summary  Date of Discharge: 11/17/17  Reason for Discharge: All goals achieved  Outcomes Achieved: Able to achieve all goals within established timeline  Discharge Destination: Home with home program      Rahel Sarmiento, PT  11/17/2017

## 2017-11-21 ENCOUNTER — APPOINTMENT (OUTPATIENT)
Dept: PHYSICAL THERAPY | Facility: HOSPITAL | Age: 72
End: 2017-11-21

## 2019-02-07 NOTE — OP NOTE
Elliott Murphy M.D. - [facility]    Patient Name:  Raheem Clay  YOB: 1945  Medical Records Number:  [id number]    Date of Procedure:  8/14/2017    Pre-operative Diagnosis:  Painful left total knee replacement due to tibial loosening.    Post-operative Diagnosis:  Same.  There was ostial lysis behind the femur to such an extent that I felt the femur needed to be revised as well.    Procedure Performed: Revision Left Total Knee Replacement     Anesthesia: General, supplemented by a periarticular Exparel/bupivacaine injection.      Surgeon: Elliott Murphy MD     Assistant: Raheem Pozo MD; note that as part of the surgical procedure, I utilized service of an assistant surgeon, specifically Raheem Pozo MD. Assistant surgeon participated in crucial portion of the operation. Use of the assistant surgeon greatly reduced overall operative time, thus significantly reducing overall morbidity for the patient.      Implants:      Implant Name Type Inv. Item Serial No.  Lot No. LRB No. Used Action   CMT BONE PALACOS [id number] - [id number] Implant CMT BONE PALACOS [id number]  [employer] [id number] Left 4 Implanted   STEM TIB SARAH REV 17.5X89MM - [id number] Implant STEM TIB SARAH REV 17.5X89MM  [employer] [id number] Left 1 Implanted   TRY TIB BIOMET 360 79MM - [id number] Implant TRY TIB BIOMET 360 79MM  [employer] [id number] Left 1 Implanted   AUG TIB VANGRD  79X5MM - [id number] Implant AUG TIB VANGRD  79X5MM  BI[employer]ET [id number] Left 1 Implanted   AUG TIB VANGRD  79X5MM - [id number] Implant AUG TIB VANGRD  79X5MM  [employer]ET [id number] Left 1 Implanted   AUG FEM DIST VANGRD  LL RM 72.5X5MM - [id number] Implant AUG FEM DIST VANGRD  LL RM 72.5X5MM  [employer]ET [id number] Left 1 Implanted   AUG FEM DIST VANGRD  RL LM 72.5X5MM - [id number] Implant AUG FEM DIST VANGRD  RL LM 72.5X5MM  [employer] [id number] Left 1 Implanted   STEM TIB SARAH REV 19.5X89MM - [id number] Implant  STEM TIB SARAH REV 19.5X89MM  [employer]ET [id number] Left 1 Implanted   COMP FEM VANGRD  72.5MM LT - [id number] Implant COMP FEM VANGRD  72.5MM LT  [employer]ET [id number] Left 1 Implanted   BEAR TIB VANGRD SSK PS NONCNSTR 14X79/83 - [id number] Implant BEAR TIB VANGRD SSK PS NONCNSTR 14X79/83   [employer] [id number] Left 1 Implanted       Tourniquet Time: Was 92 minutes on the first stage and then I reinflated for 26 minutes for cementing the implants..      Medications: Note that we gave 1 g IV tranexamic acid when the cement was mixed.      Estimated Blood Loss: 100     Complications: None.      Quality Measures: I have documented the patient's current medications including dosage, frequency, and route of administration in medical record today. Conservative alternatives were discussed with the patient as part of the decision-making process prior to the procedure. The patient was evaluated immediately preoperatively for cardiovascular and thromboembolic risk factors.  He does have a history of cardiac/coronary artery disease.  Prophylactic IV antibiotics were administered before the tourniquet went up.      Description of Procedure: After prep and drape, Left knee was approached via midline longitudinal anterior incision utilizing the previous scar.. Medial parapatellar arthrotomy was extended to the vastus medialis obliquus which was split in line with the fibers near the medial border, in keeping with minimally invasive technique.  Scar tissue was excised.  I evaluated the tibial component.  It was definitely loose and had some motion on disimpaction testing.  I checked the femoral component.  Though it was not loose, there was marked ostial lysis under the medial and lateral femoral condyle.  The bone loss under the cement was significant enough so that I did not feel the femoral component would be a long-lasting implant.  For this reason we decided to revise both the femur and the tibia.    We tackled the femoral component  first.  Osteotomes were used medially and laterally anteriorly and posteriorly to free the cement from the underlying bone.  I was able to do so without any significant damage to the underlying bone.  Once we did remove the implant I freshen the distal femoral cut over an intramedullary blake.  Was sized to the above named femoral component.  Anterior posterior and chamfer cuts were made.  The inner condylar notch was resected.  A trial implant was applied and it fit nicely with some minor adjustments.    Over an intramedullary blkae I cut the proximal tibia to good healthy bone perpendicular to the shaft.  Once this had been accomplished I did an initial trial reduction.  Rotation was marked on the tibia.  I determine the tibial implant size and created a canal in preparation for tibial stem.    Bony surfaces were thoroughly cleaned and dried.  With my first stage of cement I implanted the tibial implant constructed as noted above.  After the cement hardened I implanted/cemented the femoral component constructed as noted above.  Final trial reduction revealed a 14 mm standard PS polyethylene insert best balanced stability and range of motion and this was locked in the tibial tray.    The tourniquet a been up and stages and was released for a final time after the cement hardened.  The knee had been injected with my Exparel solution containing 20 cc Exparel, 25 cc quarter percent bupivacaine with epinephrine, and 20 cc saline protecting the popliteal neurovascular structures and the peroneal nerve as we had done throughout the case.  I closed the wound with #1 Vicryl capsule, 2-0 Vicryl in subcutaneous used tissues and staples in skin over an eighth inch drain.        Elliott Murphy MD  8/14/2017  6:06 PM       Home

## 2020-05-19 DIAGNOSIS — R00.2 PALPITATIONS: Primary | ICD-10-CM

## 2020-05-19 DIAGNOSIS — I47.1 PSVT (PAROXYSMAL SUPRAVENTRICULAR TACHYCARDIA) (HCC): ICD-10-CM

## 2020-05-20 DIAGNOSIS — I47.1 PSVT (PAROXYSMAL SUPRAVENTRICULAR TACHYCARDIA) (HCC): Primary | ICD-10-CM

## 2020-05-26 ENCOUNTER — TRANSCRIBE ORDERS (OUTPATIENT)
Dept: SLEEP MEDICINE | Facility: HOSPITAL | Age: 75
End: 2020-05-26

## 2020-05-26 DIAGNOSIS — Z01.818 OTHER SPECIFIED PRE-OPERATIVE EXAMINATION: Primary | ICD-10-CM

## 2020-05-27 ENCOUNTER — LAB (OUTPATIENT)
Dept: LAB | Facility: HOSPITAL | Age: 75
End: 2020-05-27

## 2020-05-27 DIAGNOSIS — I47.1 PSVT (PAROXYSMAL SUPRAVENTRICULAR TACHYCARDIA) (HCC): ICD-10-CM

## 2020-05-27 DIAGNOSIS — Z01.818 OTHER SPECIFIED PRE-OPERATIVE EXAMINATION: ICD-10-CM

## 2020-05-27 LAB
ALBUMIN SERPL-MCNC: 4.5 G/DL (ref 3.5–5.2)
ALBUMIN/GLOB SERPL: 1.9 G/DL
ALP SERPL-CCNC: 47 U/L (ref 39–117)
ALT SERPL W P-5'-P-CCNC: 25 U/L (ref 1–41)
ANION GAP SERPL CALCULATED.3IONS-SCNC: 11.6 MMOL/L (ref 5–15)
AST SERPL-CCNC: 21 U/L (ref 1–40)
BASOPHILS # BLD AUTO: 0.04 10*3/MM3 (ref 0–0.2)
BASOPHILS NFR BLD AUTO: 0.5 % (ref 0–1.5)
BILIRUB SERPL-MCNC: 0.7 MG/DL (ref 0.2–1.2)
BUN BLD-MCNC: 19 MG/DL (ref 8–23)
BUN/CREAT SERPL: 23.5 (ref 7–25)
CALCIUM SPEC-SCNC: 9.2 MG/DL (ref 8.6–10.5)
CHLORIDE SERPL-SCNC: 96 MMOL/L (ref 98–107)
CO2 SERPL-SCNC: 25.4 MMOL/L (ref 22–29)
CREAT BLD-MCNC: 0.81 MG/DL (ref 0.76–1.27)
DEPRECATED RDW RBC AUTO: 46.7 FL (ref 37–54)
EOSINOPHIL # BLD AUTO: 0.04 10*3/MM3 (ref 0–0.4)
EOSINOPHIL NFR BLD AUTO: 0.5 % (ref 0.3–6.2)
ERYTHROCYTE [DISTWIDTH] IN BLOOD BY AUTOMATED COUNT: 13.6 % (ref 12.3–15.4)
GFR SERPL CREATININE-BSD FRML MDRD: 93 ML/MIN/1.73
GLOBULIN UR ELPH-MCNC: 2.4 GM/DL
GLUCOSE BLD-MCNC: 255 MG/DL (ref 65–99)
HCT VFR BLD AUTO: 41.6 % (ref 37.5–51)
HGB BLD-MCNC: 13.5 G/DL (ref 13–17.7)
IMM GRANULOCYTES # BLD AUTO: 0.03 10*3/MM3 (ref 0–0.05)
IMM GRANULOCYTES NFR BLD AUTO: 0.4 % (ref 0–0.5)
INR PPP: 1.02 (ref 0.9–1.1)
LYMPHOCYTES # BLD AUTO: 1.64 10*3/MM3 (ref 0.7–3.1)
LYMPHOCYTES NFR BLD AUTO: 20.8 % (ref 19.6–45.3)
MCH RBC QN AUTO: 29.8 PG (ref 26.6–33)
MCHC RBC AUTO-ENTMCNC: 32.5 G/DL (ref 31.5–35.7)
MCV RBC AUTO: 91.8 FL (ref 79–97)
MONOCYTES # BLD AUTO: 0.53 10*3/MM3 (ref 0.1–0.9)
MONOCYTES NFR BLD AUTO: 6.7 % (ref 5–12)
NEUTROPHILS # BLD AUTO: 5.59 10*3/MM3 (ref 1.7–7)
NEUTROPHILS NFR BLD AUTO: 71.1 % (ref 42.7–76)
NRBC BLD AUTO-RTO: 0 /100 WBC (ref 0–0.2)
PLATELET # BLD AUTO: 191 10*3/MM3 (ref 140–450)
PMV BLD AUTO: 11.3 FL (ref 6–12)
POTASSIUM BLD-SCNC: 4.7 MMOL/L (ref 3.5–5.2)
PROT SERPL-MCNC: 6.9 G/DL (ref 6–8.5)
PROTHROMBIN TIME: 13.1 SECONDS (ref 11.7–14.2)
RBC # BLD AUTO: 4.53 10*6/MM3 (ref 4.14–5.8)
SODIUM BLD-SCNC: 133 MMOL/L (ref 136–145)
WBC NRBC COR # BLD: 7.87 10*3/MM3 (ref 3.4–10.8)

## 2020-05-27 PROCEDURE — U0004 COV-19 TEST NON-CDC HGH THRU: HCPCS

## 2020-05-27 PROCEDURE — 80053 COMPREHEN METABOLIC PANEL: CPT

## 2020-05-27 PROCEDURE — C9803 HOPD COVID-19 SPEC COLLECT: HCPCS

## 2020-05-27 PROCEDURE — 36415 COLL VENOUS BLD VENIPUNCTURE: CPT

## 2020-05-27 PROCEDURE — 85025 COMPLETE CBC W/AUTO DIFF WBC: CPT

## 2020-05-27 PROCEDURE — 85610 PROTHROMBIN TIME: CPT

## 2020-05-28 LAB
REF LAB TEST METHOD: NORMAL
SARS-COV-2 RNA RESP QL NAA+PROBE: NOT DETECTED

## 2020-05-29 ENCOUNTER — HOSPITAL ENCOUNTER (OUTPATIENT)
Facility: HOSPITAL | Age: 75
Setting detail: HOSPITAL OUTPATIENT SURGERY
Discharge: HOME | End: 2020-05-29
Attending: INTERNAL MEDICINE | Admitting: INTERNAL MEDICINE

## 2020-05-29 VITALS
BODY MASS INDEX: 32.73 KG/M2 | TEMPERATURE: 98.5 F | OXYGEN SATURATION: 96 % | DIASTOLIC BLOOD PRESSURE: 76 MMHG | HEART RATE: 88 BPM | WEIGHT: 255 LBS | SYSTOLIC BLOOD PRESSURE: 149 MMHG | HEIGHT: 74 IN | RESPIRATION RATE: 16 BRPM

## 2020-05-29 DIAGNOSIS — R00.2 PALPITATIONS: ICD-10-CM

## 2020-05-29 DIAGNOSIS — I47.1 PSVT (PAROXYSMAL SUPRAVENTRICULAR TACHYCARDIA) (HCC): ICD-10-CM

## 2020-05-29 LAB
ACT BLD: 202 SECONDS (ref 82–152)
GLUCOSE BLDC GLUCOMTR-MCNC: 195 MG/DL (ref 70–130)
GLUCOSE BLDC GLUCOMTR-MCNC: 247 MG/DL (ref 70–130)

## 2020-05-29 PROCEDURE — 25010000002 HEPARIN (PORCINE) PER 1000 UNITS: Performed by: INTERNAL MEDICINE

## 2020-05-29 PROCEDURE — 25010000002 MIDAZOLAM PER 1 MG: Performed by: INTERNAL MEDICINE

## 2020-05-29 PROCEDURE — 99152 MOD SED SAME PHYS/QHP 5/>YRS: CPT | Performed by: INTERNAL MEDICINE

## 2020-05-29 PROCEDURE — 99153 MOD SED SAME PHYS/QHP EA: CPT | Performed by: INTERNAL MEDICINE

## 2020-05-29 PROCEDURE — 85347 COAGULATION TIME ACTIVATED: CPT

## 2020-05-29 PROCEDURE — C1766 INTRO/SHEATH,STRBLE,NON-PEEL: HCPCS | Performed by: INTERNAL MEDICINE

## 2020-05-29 PROCEDURE — C1894 INTRO/SHEATH, NON-LASER: HCPCS

## 2020-05-29 PROCEDURE — C1732 CATH, EP, DIAG/ABL, 3D/VECT: HCPCS | Performed by: INTERNAL MEDICINE

## 2020-05-29 PROCEDURE — 93613 INTRACARDIAC EPHYS 3D MAPG: CPT | Performed by: INTERNAL MEDICINE

## 2020-05-29 PROCEDURE — C1894 INTRO/SHEATH, NON-LASER: HCPCS | Performed by: INTERNAL MEDICINE

## 2020-05-29 PROCEDURE — 25010000003 LIDOCAINE 1 % SOLUTION: Performed by: INTERNAL MEDICINE

## 2020-05-29 PROCEDURE — C1730 CATH, EP, 19 OR FEW ELECT: HCPCS | Performed by: INTERNAL MEDICINE

## 2020-05-29 PROCEDURE — 93653 COMPRE EP EVAL TX SVT: CPT | Performed by: INTERNAL MEDICINE

## 2020-05-29 PROCEDURE — 25010000002 FENTANYL CITRATE (PF) 100 MCG/2ML SOLUTION: Performed by: INTERNAL MEDICINE

## 2020-05-29 PROCEDURE — C1733 CATH, EP, OTHR THAN COOL-TIP: HCPCS | Performed by: INTERNAL MEDICINE

## 2020-05-29 PROCEDURE — 82962 GLUCOSE BLOOD TEST: CPT

## 2020-05-29 PROCEDURE — 25010000002 PROTAMINE SULFATE PER 10 MG: Performed by: INTERNAL MEDICINE

## 2020-05-29 RX ORDER — SODIUM CHLORIDE 9 MG/ML
75 INJECTION, SOLUTION INTRAVENOUS CONTINUOUS
Status: DISCONTINUED | OUTPATIENT
Start: 2020-05-29 | End: 2020-05-29 | Stop reason: HOSPADM

## 2020-05-29 RX ORDER — SODIUM CHLORIDE 0.9 % (FLUSH) 0.9 %
10 SYRINGE (ML) INJECTION AS NEEDED
Status: DISCONTINUED | OUTPATIENT
Start: 2020-05-29 | End: 2020-05-29 | Stop reason: HOSPADM

## 2020-05-29 RX ORDER — LIDOCAINE HYDROCHLORIDE 10 MG/ML
INJECTION, SOLUTION INFILTRATION; PERINEURAL AS NEEDED
Status: DISCONTINUED | OUTPATIENT
Start: 2020-05-29 | End: 2020-05-29 | Stop reason: HOSPADM

## 2020-05-29 RX ORDER — HEPARIN SODIUM 1000 [USP'U]/ML
INJECTION, SOLUTION INTRAVENOUS; SUBCUTANEOUS AS NEEDED
Status: DISCONTINUED | OUTPATIENT
Start: 2020-05-29 | End: 2020-05-29 | Stop reason: HOSPADM

## 2020-05-29 RX ORDER — MIDAZOLAM HYDROCHLORIDE 1 MG/ML
INJECTION INTRAMUSCULAR; INTRAVENOUS AS NEEDED
Status: DISCONTINUED | OUTPATIENT
Start: 2020-05-29 | End: 2020-05-29 | Stop reason: HOSPADM

## 2020-05-29 RX ORDER — FENTANYL CITRATE 50 UG/ML
INJECTION, SOLUTION INTRAMUSCULAR; INTRAVENOUS AS NEEDED
Status: DISCONTINUED | OUTPATIENT
Start: 2020-05-29 | End: 2020-05-29 | Stop reason: HOSPADM

## 2020-05-29 RX ORDER — ONDANSETRON 2 MG/ML
4 INJECTION INTRAMUSCULAR; INTRAVENOUS EVERY 6 HOURS PRN
Status: DISCONTINUED | OUTPATIENT
Start: 2020-05-29 | End: 2020-05-29 | Stop reason: HOSPADM

## 2020-05-29 RX ORDER — PROTAMINE SULFATE 10 MG/ML
INJECTION, SOLUTION INTRAVENOUS AS NEEDED
Status: DISCONTINUED | OUTPATIENT
Start: 2020-05-29 | End: 2020-05-29 | Stop reason: HOSPADM

## 2020-05-29 RX ORDER — LIDOCAINE HYDROCHLORIDE 10 MG/ML
0.1 INJECTION, SOLUTION EPIDURAL; INFILTRATION; INTRACAUDAL; PERINEURAL ONCE AS NEEDED
Status: DISCONTINUED | OUTPATIENT
Start: 2020-05-29 | End: 2020-05-29 | Stop reason: HOSPADM

## 2020-05-29 RX ORDER — SODIUM CHLORIDE 0.9 % (FLUSH) 0.9 %
3 SYRINGE (ML) INJECTION EVERY 12 HOURS SCHEDULED
Status: DISCONTINUED | OUTPATIENT
Start: 2020-05-29 | End: 2020-05-29 | Stop reason: HOSPADM

## 2020-05-29 RX ADMIN — SODIUM CHLORIDE 75 ML/HR: 9 INJECTION, SOLUTION INTRAVENOUS at 11:36

## 2023-08-08 NOTE — THERAPY TREATMENT NOTE
Outpatient Physical Therapy Ortho {Note Type:79202}  [facility]     Patient Name: Raheem Clay  : 1945  MRN: [id number]  Today's Date: 2017      Visit Date: 2017    Visit Dx:    ICD-10-CM ICD-9-CM   1. Acute pain of left knee M25.562 719.46   2. Knee stiffness, left M25.662 719.56   3. Status post total left knee replacement Z96.652 V43.65       Patient Active Problem List   Diagnosis   • Osteoarthritis, knee        Past Medical History:   Diagnosis Date   • Arthritis    • Back pain    • Coronary artery disease     cabg x 4   • Diabetes mellitus     type 2   • High cholesterol    • Hypertension    • Knee pain, left    • Sleep apnea         Past Surgical History:   Procedure Laterality Date   • APPENDECTOMY     • CARDIAC SURGERY      cabg x 4 2016   • ELBOW ARTHROSCOPY Left     golfers elbow   • FOOT SURGERY Right     trauma   crushed and skin grafts   • JOINT REPLACEMENT      lt knee   • NJ REVISE KNEE JOINT REPLACE,1 PART Left 2017    Procedure: LT TOTAL KNEE ARTHROPLASTY REVISION;  Surgeon: Elliott Murphy MD;  Location: [facility];  Service: Orthopedics             PT Ortho       17 0800    LLE Quick Girth (cm)    Mid patella 44 cm  -DM      17 1600    LLE Quick Girth (cm)    Mid patella 45 cm  -DM      User Key  (r) = Recorded By, (t) = Taken By, (c) = Cosigned By    Initials Name Provider Type    JAMES Sarmiento, PT Physical Therapist                            PT Assessment/Plan       17 0953       PT Assessment    Assessment Comments Overall his knee is doing well except for extension.  His extension is still lacking and he is painful with any mobilization or stretching of knee.    -DM     PT Plan    PT Plan Comments cont with strengthenign and focus on knee extension.   Pt to do knee extension positioning at home  -DM       User Key  (r) = Recorded By, (t) = Taken By, (c) = Cosigned By    Initials Name Provider Type    JAMES Sarmiento, JOSE Physical  Therapist                Modalities       09/28/17 0800          Ice    Ice Applied Yes  -DM      Location left knee  -DM      Rx Minutes 15 mins  -DM      ELECTRICAL STIMULATION    Attended/Unattended Unattended  -DM      Stimulation Type IFC  -DM      Max mAmp 25  -DM      Location/Electrode Placement/Other crossed pattern on posterior left knee  -DM      Rx Minutes 15 mins  -DM        User Key  (r) = Recorded By, (t) = Taken By, (c) = Cosigned By    Initials Name Provider Type    JAMES Sarmiento, PT Physical Therapist                Exercises       09/28/17 0800          Subjective Comments    Subjective Comments The Treatment on the knee helped.  Started having bee sting like pain in the lateral ankle last night.    -DM      Subjective Pain    Able to rate subjective pain? yes  -DM      Pre-Treatment Pain Level 3  -DM      Post-Treatment Pain Level 2  -DM      Exercise 1    Exercise Name 1 Began on Recumbent bike. Completed exercises as on flow sheet.   -DM      Cueing 1 Verbal;Tactile;Demo  -DM      Exercise 2    Exercise Name 2 Positioned with towel roll under heel for knee extension gains x 5 minutes and intermittently during Ice and Estim  -DM        User Key  (r) = Recorded By, (t) = Taken By, (c) = Cosigned By    Initials Name Provider Type    JAMES Sarmiento, PT Physical Therapist                               PT OP Goals       09/28/17 0800       PT Short Term Goals    STG 1 Pt will be independent with ROM and intitial strengthening program for left knee.  -DM     STG 1 Progress Met  -DM     STG 2 Pt will demonstrate improved left knee ROM to -5 to 110  -DM     STG 2 Progress Progressing  -DM     STG 2 Progress Comments -5 to 125  -DM     STG 3 Pt will climb stairs with reciprocal gait using hand raill 100% of time  -DM     STG 3 Progress Partially Met  -DM     STG 3 Progress Comments Able to do 90% of time.  -DM     Long Term Goals    LTG 1 Pt will be independent with LE stabilization/strengthening  program for return to his preop functional level.  -DM     LTG 1 Progress Progressing  -DM     LTG 1 Progress Comments Progressed to using pulleys for more resistance.  -DM     LTG 2 Pt will demonstrate improved left knee ROM 0-120 degrees  -DM     LTG 2 Progress Partially Met  -DM     LTG 2 Progress Comments -5 to 125  -DM     LTG 3 Pt will demonstrate decreased knee edema by 2 cm to allow normal mobility of knee with all weight bearing tasks  -DM     LTG 3 Progress Progressing  -DM     LTG 3 Progress Comments decreased 1 cm to 44 cm  -DM     LTG 4 Pt will demonstrate improved strength in left knee by 1 grade for stability with all weight bearing tasks  -DM     LTG 4 Progress Progressing  -DM     LTG 4 Progress Comments Tolerating increased resistance without any increased pain.   -DM     LTG 5 Pt will report improved function via KOOS from 44% to 20% or less disability  -DM     LTG 5 Progress Progressing  -DM     LTG 5 Progress Comments Improved slightly to 40%  -DM       User Key  (r) = Recorded By, (t) = Taken By, (c) = Cosigned By    Initials Name Provider Type    JAMES Sarmiento PT Physical Therapist                    Outcome Measures       09/28/17 0900          Knee Outcome Score    Knee Outcome Score Comments 48/80 or 40% disability  -DM      Functional Assessment    Outcome Measure Options Knee Outcome Score- ADL  -DM        User Key  (r) = Recorded By, (t) = Taken By, (c) = Cosigned By    Initials Name Provider Type    JAMES Sarmiento PT Physical Therapist            Time Calculation:   Start Time: 0730  Stop Time: 0815  Time Calculation (min): 45 min    Therapy Charges for Today     Code Description Service Date Service Provider Modifiers Qty    68756003351 HC PT MOBILITY PROJECTED 9/28/2017 Rahel Sarmiento PT GP, CK 1    37140015851 HC PT MOBILITY DISCHARGE 9/28/2017 Rahel Sarmiento PT GP, CJ 1    16098597309 HC PT THER PROC EA 15 MIN 9/28/2017 Rahel Sarmiento PT GP 2    36585606942 HC PT HOT OR COLD  PACK TREAT MCARE 9/28/2017 Rahel Sarmiento, PT GP 1    44940551448 HC PT ELECTRICAL STIM UNATTENDED 9/28/2017 Rahel Sarmiento, PT  1          PT G-Codes  PT Professional Judgement Used?: Yes  Outcome Measure Options: Knee Outcome Score- ADL  Score: 48/80 or 40% disability  Mobility: Walking and Moving Around Goal Status (): At least 40 percent but less than 60 percent impaired, limited or restricted  Mobility: Walking and Moving Around Discharge Status (): At least 20 percent but less than 40 percent impaired, limited or restricted         Rahel Sarmiento, PT  9/28/2017        Cimetidine Counseling:  I discussed with the patient the risks of Cimetidine including but not limited to gynecomastia, headache, diarrhea, nausea, drowsiness, arrhythmias, pancreatitis, skin rashes, psychosis, bone marrow suppression and kidney toxicity.

## (undated) DEVICE — CATH EP INQUIRY DECAPLR CRV 6F 2TO5TO2MM 110CM LG

## (undated) DEVICE — BNDG ELAS ELITE V/CLOSE 6IN 5YD LF STRL

## (undated) DEVICE — PK KN TOTL 40

## (undated) DEVICE — CULT AER/ANAEROB FASTIDIOUS BACT

## (undated) DEVICE — DUAL CUT SAGITTAL BLADE

## (undated) DEVICE — ANTIBACTERIAL UNDYED BRAIDED (POLYGLACTIN 910), SYNTHETIC ABSORBABLE SUTURE: Brand: COATED VICRYL

## (undated) DEVICE — GLV SURG TRIUMPH CLASSIC PF LTX 8 STRL

## (undated) DEVICE — NDL SPINE 20G 3 1/2 YEL STRL 1P/U

## (undated) DEVICE — SOL NACL 0.9PCT 1000ML

## (undated) DEVICE — SYR LUERLOK 20CC

## (undated) DEVICE — PIN TROC SIGNATURE PK/2

## (undated) DEVICE — CATH EP SUPRM QUADPOLAR JSN1 6F 5MM 120CM

## (undated) DEVICE — INTRO STEER AGILIS NXT MED/CURL 8.5F

## (undated) DEVICE — PINNACLE INTRODUCER SHEATH: Brand: PINNACLE

## (undated) DEVICE — UNDERCAST PADDING: Brand: DEROYAL

## (undated) DEVICE — GLV SURG TRIUMPH CLASSIC PF LTX 8.5 STRL

## (undated) DEVICE — HANDPIECE SET WITH COAXIAL HIGH FLOW TIP AND SUCTION TUBE: Brand: INTERPULSE

## (undated) DEVICE — INTRO HEMO FASTCATH CATH/LOCK 7F 12CM

## (undated) DEVICE — PREMIUM WET SKIN PREP TRAY: Brand: MEDLINE INDUSTRIES, INC.

## (undated) DEVICE — KT DRN EVAC WND PVC PCH WTROC RND 10F400

## (undated) DEVICE — GAUZE,SPONGE,FLUFF,6"X6.75",STRL,10/TRAY: Brand: MEDLINE

## (undated) DEVICE — ENCORE® LATEX ORTHO SIZE 8.5, STERILE LATEX POWDER-FREE SURGICAL GLOVE: Brand: ENCORE

## (undated) DEVICE — DRSNG ADAPTIC 3X8

## (undated) DEVICE — CEMENT MIXING SYSTEM WITH FEMORAL BREAKWAY NOZZLE: Brand: REVOLUTION

## (undated) DEVICE — CATH ADVISOR HD GRID MAP SENSR ENABLED

## (undated) DEVICE — APPL DURAPREP IODOPHOR APL 26ML

## (undated) DEVICE — BANDAGE,GAUZE,BULKEE II,4.5"X4.1YD,STRL: Brand: MEDLINE

## (undated) DEVICE — STPLR SKIN VISISTAT WD 35CT

## (undated) DEVICE — LOU EP: Brand: MEDLINE INDUSTRIES, INC.

## (undated) DEVICE — DRSNG WND GZ CURAD OIL EMULSION 3X8IN LF STRL 1PK

## (undated) DEVICE — CATH EP SUPREME QUADPOLAR CRD2 5F 5MM 120CM

## (undated) DEVICE — KT ELECTRD ENSITE PRECISION SURF

## (undated) DEVICE — RECIPROCATING BLADE, DOUBLE SIDED, OFFSET  (70.0 X 0.64 X 12.6MM)

## (undated) DEVICE — CATH ABL SAFIRE BIDIR MC 7F 4MM